# Patient Record
Sex: FEMALE | Race: WHITE | NOT HISPANIC OR LATINO
[De-identification: names, ages, dates, MRNs, and addresses within clinical notes are randomized per-mention and may not be internally consistent; named-entity substitution may affect disease eponyms.]

---

## 2017-12-27 PROBLEM — Z00.00 ENCOUNTER FOR PREVENTIVE HEALTH EXAMINATION: Status: ACTIVE | Noted: 2017-12-27

## 2018-01-11 ENCOUNTER — APPOINTMENT (OUTPATIENT)
Dept: ULTRASOUND IMAGING | Facility: IMAGING CENTER | Age: 78
End: 2018-01-11

## 2018-02-05 ENCOUNTER — APPOINTMENT (OUTPATIENT)
Dept: SURGICAL ONCOLOGY | Facility: CLINIC | Age: 78
End: 2018-02-05

## 2018-02-05 VITALS
SYSTOLIC BLOOD PRESSURE: 192 MMHG | OXYGEN SATURATION: 97 % | HEIGHT: 60 IN | BODY MASS INDEX: 29.45 KG/M2 | HEART RATE: 67 BPM | DIASTOLIC BLOOD PRESSURE: 91 MMHG | WEIGHT: 150 LBS

## 2018-02-12 ENCOUNTER — APPOINTMENT (OUTPATIENT)
Dept: SURGICAL ONCOLOGY | Facility: CLINIC | Age: 78
End: 2018-02-12
Payer: MEDICARE

## 2018-02-12 VITALS
BODY MASS INDEX: 29.45 KG/M2 | OXYGEN SATURATION: 98 % | HEART RATE: 60 BPM | DIASTOLIC BLOOD PRESSURE: 80 MMHG | WEIGHT: 150 LBS | SYSTOLIC BLOOD PRESSURE: 172 MMHG | HEIGHT: 60 IN

## 2018-02-12 DIAGNOSIS — Z82.49 FAMILY HISTORY OF ISCHEMIC HEART DISEASE AND OTHER DISEASES OF THE CIRCULATORY SYSTEM: ICD-10-CM

## 2018-02-12 DIAGNOSIS — Z78.9 OTHER SPECIFIED HEALTH STATUS: ICD-10-CM

## 2018-02-12 PROCEDURE — 99205 OFFICE O/P NEW HI 60 MIN: CPT

## 2018-02-22 ENCOUNTER — LABORATORY RESULT (OUTPATIENT)
Age: 78
End: 2018-02-22

## 2018-02-22 ENCOUNTER — APPOINTMENT (OUTPATIENT)
Dept: SURGICAL ONCOLOGY | Facility: CLINIC | Age: 78
End: 2018-02-22
Payer: MEDICARE

## 2018-02-22 DIAGNOSIS — E04.9 NONTOXIC GOITER, UNSPECIFIED: ICD-10-CM

## 2018-02-22 PROCEDURE — 10022: CPT

## 2018-09-10 ENCOUNTER — APPOINTMENT (OUTPATIENT)
Dept: SURGICAL ONCOLOGY | Facility: CLINIC | Age: 78
End: 2018-09-10
Payer: MEDICARE

## 2018-09-10 VITALS
BODY MASS INDEX: 29.45 KG/M2 | DIASTOLIC BLOOD PRESSURE: 74 MMHG | WEIGHT: 150 LBS | RESPIRATION RATE: 15 BRPM | SYSTOLIC BLOOD PRESSURE: 155 MMHG | HEART RATE: 60 BPM | HEIGHT: 60 IN

## 2018-09-10 DIAGNOSIS — E04.2 NONTOXIC MULTINODULAR GOITER: ICD-10-CM

## 2018-09-10 DIAGNOSIS — E04.1 NONTOXIC SINGLE THYROID NODULE: ICD-10-CM

## 2018-09-10 PROCEDURE — 99214 OFFICE O/P EST MOD 30 MIN: CPT

## 2018-09-18 ENCOUNTER — FORM ENCOUNTER (OUTPATIENT)
Age: 78
End: 2018-09-18

## 2018-09-19 ENCOUNTER — OUTPATIENT (OUTPATIENT)
Dept: OUTPATIENT SERVICES | Facility: HOSPITAL | Age: 78
LOS: 1 days | End: 2018-09-19
Payer: MEDICARE

## 2018-09-19 ENCOUNTER — APPOINTMENT (OUTPATIENT)
Dept: ULTRASOUND IMAGING | Facility: IMAGING CENTER | Age: 78
End: 2018-09-19
Payer: MEDICARE

## 2018-09-19 DIAGNOSIS — E04.9 NONTOXIC GOITER, UNSPECIFIED: ICD-10-CM

## 2018-09-19 DIAGNOSIS — E04.2 NONTOXIC MULTINODULAR GOITER: ICD-10-CM

## 2018-09-19 PROCEDURE — 76536 US EXAM OF HEAD AND NECK: CPT | Mod: 26

## 2018-09-19 PROCEDURE — 76536 US EXAM OF HEAD AND NECK: CPT

## 2020-10-21 ENCOUNTER — RESULT REVIEW (OUTPATIENT)
Age: 80
End: 2020-10-21

## 2021-04-02 ENCOUNTER — APPOINTMENT (OUTPATIENT)
Dept: OBGYN | Facility: CLINIC | Age: 81
End: 2021-04-02

## 2024-02-12 ENCOUNTER — INPATIENT (INPATIENT)
Facility: HOSPITAL | Age: 84
LOS: 3 days | Discharge: HOME HEALTH | End: 2024-02-16
Attending: STUDENT IN AN ORGANIZED HEALTH CARE EDUCATION/TRAINING PROGRAM | Admitting: STUDENT IN AN ORGANIZED HEALTH CARE EDUCATION/TRAINING PROGRAM
Payer: MEDICARE

## 2024-02-12 VITALS
HEART RATE: 102 BPM | DIASTOLIC BLOOD PRESSURE: 100 MMHG | RESPIRATION RATE: 22 BRPM | SYSTOLIC BLOOD PRESSURE: 135 MMHG | OXYGEN SATURATION: 100 % | TEMPERATURE: 97 F

## 2024-02-12 DIAGNOSIS — I50.9 HEART FAILURE, UNSPECIFIED: ICD-10-CM

## 2024-02-12 DIAGNOSIS — I10 ESSENTIAL (PRIMARY) HYPERTENSION: ICD-10-CM

## 2024-02-12 DIAGNOSIS — E78.5 HYPERLIPIDEMIA, UNSPECIFIED: ICD-10-CM

## 2024-02-12 DIAGNOSIS — I48.91 UNSPECIFIED ATRIAL FIBRILLATION: ICD-10-CM

## 2024-02-12 DIAGNOSIS — Z29.9 ENCOUNTER FOR PROPHYLACTIC MEASURES, UNSPECIFIED: ICD-10-CM

## 2024-02-12 DIAGNOSIS — E04.9 NONTOXIC GOITER, UNSPECIFIED: ICD-10-CM

## 2024-02-12 LAB
ALBUMIN SERPL ELPH-MCNC: 4.4 G/DL — SIGNIFICANT CHANGE UP (ref 3.3–5)
ALP SERPL-CCNC: 70 U/L — SIGNIFICANT CHANGE UP (ref 40–120)
ALT FLD-CCNC: 24 U/L — SIGNIFICANT CHANGE UP (ref 4–33)
ANION GAP SERPL CALC-SCNC: 15 MMOL/L — HIGH (ref 7–14)
ANION GAP SERPL CALC-SCNC: 20 MMOL/L — HIGH (ref 7–14)
APTT BLD: 33.4 SEC — SIGNIFICANT CHANGE UP (ref 24.5–35.6)
AST SERPL-CCNC: 47 U/L — HIGH (ref 4–32)
BASE EXCESS BLDV CALC-SCNC: 0.2 MMOL/L — SIGNIFICANT CHANGE UP (ref -2–3)
BASOPHILS # BLD AUTO: 0.05 K/UL — SIGNIFICANT CHANGE UP (ref 0–0.2)
BASOPHILS NFR BLD AUTO: 0.5 % — SIGNIFICANT CHANGE UP (ref 0–2)
BILIRUB SERPL-MCNC: 1.1 MG/DL — SIGNIFICANT CHANGE UP (ref 0.2–1.2)
BLOOD GAS VENOUS COMPREHENSIVE RESULT: SIGNIFICANT CHANGE UP
BLOOD GAS VENOUS COMPREHENSIVE RESULT: SIGNIFICANT CHANGE UP
BUN SERPL-MCNC: 24 MG/DL — HIGH (ref 7–23)
BUN SERPL-MCNC: 26 MG/DL — HIGH (ref 7–23)
CALCIUM SERPL-MCNC: 9.4 MG/DL — SIGNIFICANT CHANGE UP (ref 8.4–10.5)
CALCIUM SERPL-MCNC: 9.8 MG/DL — SIGNIFICANT CHANGE UP (ref 8.4–10.5)
CHLORIDE BLDV-SCNC: 98 MMOL/L — SIGNIFICANT CHANGE UP (ref 96–108)
CHLORIDE SERPL-SCNC: 95 MMOL/L — LOW (ref 98–107)
CHLORIDE SERPL-SCNC: 97 MMOL/L — LOW (ref 98–107)
CO2 BLDV-SCNC: 28 MMOL/L — HIGH (ref 22–26)
CO2 SERPL-SCNC: 20 MMOL/L — LOW (ref 22–31)
CO2 SERPL-SCNC: 28 MMOL/L — SIGNIFICANT CHANGE UP (ref 22–31)
CREAT SERPL-MCNC: 0.88 MG/DL — SIGNIFICANT CHANGE UP (ref 0.5–1.3)
CREAT SERPL-MCNC: 0.92 MG/DL — SIGNIFICANT CHANGE UP (ref 0.5–1.3)
EGFR: 62 ML/MIN/1.73M2 — SIGNIFICANT CHANGE UP
EGFR: 65 ML/MIN/1.73M2 — SIGNIFICANT CHANGE UP
EOSINOPHIL # BLD AUTO: 0.01 K/UL — SIGNIFICANT CHANGE UP (ref 0–0.5)
EOSINOPHIL NFR BLD AUTO: 0.1 % — SIGNIFICANT CHANGE UP (ref 0–6)
GAS PNL BLDV: 128 MMOL/L — LOW (ref 136–145)
GLUCOSE BLDV-MCNC: 116 MG/DL — HIGH (ref 70–99)
GLUCOSE SERPL-MCNC: 101 MG/DL — HIGH (ref 70–99)
GLUCOSE SERPL-MCNC: 114 MG/DL — HIGH (ref 70–99)
HCO3 BLDV-SCNC: 26 MMOL/L — SIGNIFICANT CHANGE UP (ref 22–29)
HCT VFR BLD CALC: 44.8 % — SIGNIFICANT CHANGE UP (ref 34.5–45)
HCT VFR BLDA CALC: 44 % — SIGNIFICANT CHANGE UP (ref 34.5–46.5)
HGB BLD CALC-MCNC: 14.5 G/DL — SIGNIFICANT CHANGE UP (ref 11.7–16.1)
HGB BLD-MCNC: 14 G/DL — SIGNIFICANT CHANGE UP (ref 11.5–15.5)
IANC: 8.26 K/UL — HIGH (ref 1.8–7.4)
IMM GRANULOCYTES NFR BLD AUTO: 0.5 % — SIGNIFICANT CHANGE UP (ref 0–0.9)
INR BLD: 1.27 RATIO — HIGH (ref 0.85–1.18)
LACTATE BLDV-MCNC: 3.8 MMOL/L — HIGH (ref 0.5–2)
LACTATE SERPL-SCNC: 2.9 MMOL/L — HIGH (ref 0.5–2)
LYMPHOCYTES # BLD AUTO: 1.94 K/UL — SIGNIFICANT CHANGE UP (ref 1–3.3)
LYMPHOCYTES # BLD AUTO: 17.8 % — SIGNIFICANT CHANGE UP (ref 13–44)
MAGNESIUM SERPL-MCNC: 1.8 MG/DL — SIGNIFICANT CHANGE UP (ref 1.6–2.6)
MAGNESIUM SERPL-MCNC: 2.2 MG/DL — SIGNIFICANT CHANGE UP (ref 1.6–2.6)
MCHC RBC-ENTMCNC: 25.1 PG — LOW (ref 27–34)
MCHC RBC-ENTMCNC: 31.3 GM/DL — LOW (ref 32–36)
MCV RBC AUTO: 80.3 FL — SIGNIFICANT CHANGE UP (ref 80–100)
MONOCYTES # BLD AUTO: 0.59 K/UL — SIGNIFICANT CHANGE UP (ref 0–0.9)
MONOCYTES NFR BLD AUTO: 5.4 % — SIGNIFICANT CHANGE UP (ref 2–14)
NEUTROPHILS # BLD AUTO: 8.26 K/UL — HIGH (ref 1.8–7.4)
NEUTROPHILS NFR BLD AUTO: 75.7 % — SIGNIFICANT CHANGE UP (ref 43–77)
NRBC # BLD: 0 /100 WBCS — SIGNIFICANT CHANGE UP (ref 0–0)
NRBC # FLD: 0 K/UL — SIGNIFICANT CHANGE UP (ref 0–0)
NT-PROBNP SERPL-SCNC: HIGH PG/ML
PCO2 BLDV: 48 MMHG — SIGNIFICANT CHANGE UP (ref 39–52)
PH BLDV: 7.35 — SIGNIFICANT CHANGE UP (ref 7.32–7.43)
PHOSPHATE SERPL-MCNC: 3.8 MG/DL — SIGNIFICANT CHANGE UP (ref 2.5–4.5)
PHOSPHATE SERPL-MCNC: 4 MG/DL — SIGNIFICANT CHANGE UP (ref 2.5–4.5)
PLATELET # BLD AUTO: 201 K/UL — SIGNIFICANT CHANGE UP (ref 150–400)
PO2 BLDV: 21 MMHG — LOW (ref 25–45)
POTASSIUM BLDV-SCNC: SIGNIFICANT CHANGE UP MMOL/L (ref 3.5–5.1)
POTASSIUM SERPL-MCNC: 3.4 MMOL/L — LOW (ref 3.5–5.3)
POTASSIUM SERPL-MCNC: 5.3 MMOL/L — SIGNIFICANT CHANGE UP (ref 3.5–5.3)
POTASSIUM SERPL-SCNC: 3.4 MMOL/L — LOW (ref 3.5–5.3)
POTASSIUM SERPL-SCNC: 5.3 MMOL/L — SIGNIFICANT CHANGE UP (ref 3.5–5.3)
PROT SERPL-MCNC: 8 G/DL — SIGNIFICANT CHANGE UP (ref 6–8.3)
PROTHROM AB SERPL-ACNC: 14.1 SEC — HIGH (ref 9.5–13)
RBC # BLD: 5.58 M/UL — HIGH (ref 3.8–5.2)
RBC # FLD: 17.3 % — HIGH (ref 10.3–14.5)
SAO2 % BLDV: 42 % — LOW (ref 67–88)
SODIUM SERPL-SCNC: 135 MMOL/L — SIGNIFICANT CHANGE UP (ref 135–145)
SODIUM SERPL-SCNC: 140 MMOL/L — SIGNIFICANT CHANGE UP (ref 135–145)
T3 SERPL-MCNC: 112 NG/DL — SIGNIFICANT CHANGE UP (ref 80–200)
T4 AB SER-ACNC: 13.72 UG/DL — HIGH (ref 5.1–13)
TROPONIN T, HIGH SENSITIVITY RESULT: 29 NG/L — SIGNIFICANT CHANGE UP
TSH SERPL-MCNC: 0.64 UIU/ML — SIGNIFICANT CHANGE UP (ref 0.27–4.2)
WBC # BLD: 10.9 K/UL — HIGH (ref 3.8–10.5)
WBC # FLD AUTO: 10.9 K/UL — HIGH (ref 3.8–10.5)

## 2024-02-12 PROCEDURE — 93308 TTE F-UP OR LMTD: CPT | Mod: 26

## 2024-02-12 PROCEDURE — 71046 X-RAY EXAM CHEST 2 VIEWS: CPT | Mod: 26

## 2024-02-12 PROCEDURE — 99222 1ST HOSP IP/OBS MODERATE 55: CPT | Mod: GC

## 2024-02-12 PROCEDURE — 99222 1ST HOSP IP/OBS MODERATE 55: CPT | Mod: AI

## 2024-02-12 PROCEDURE — 71275 CT ANGIOGRAPHY CHEST: CPT | Mod: 26

## 2024-02-12 PROCEDURE — 99291 CRITICAL CARE FIRST HOUR: CPT | Mod: GC

## 2024-02-12 RX ORDER — SODIUM CHLORIDE 9 MG/ML
1000 INJECTION INTRAMUSCULAR; INTRAVENOUS; SUBCUTANEOUS ONCE
Refills: 0 | Status: DISCONTINUED | OUTPATIENT
Start: 2024-02-12 | End: 2024-02-12

## 2024-02-12 RX ORDER — ATORVASTATIN CALCIUM 80 MG/1
40 TABLET, FILM COATED ORAL AT BEDTIME
Refills: 0 | Status: DISCONTINUED | OUTPATIENT
Start: 2024-02-12 | End: 2024-02-16

## 2024-02-12 RX ORDER — FUROSEMIDE 40 MG
40 TABLET ORAL EVERY 12 HOURS
Refills: 0 | Status: DISCONTINUED | OUTPATIENT
Start: 2024-02-12 | End: 2024-02-15

## 2024-02-12 RX ORDER — DIGOXIN 250 MCG
500 TABLET ORAL ONCE
Refills: 0 | Status: COMPLETED | OUTPATIENT
Start: 2024-02-12 | End: 2024-02-12

## 2024-02-12 RX ORDER — SENNA PLUS 8.6 MG/1
2 TABLET ORAL AT BEDTIME
Refills: 0 | Status: DISCONTINUED | OUTPATIENT
Start: 2024-02-12 | End: 2024-02-16

## 2024-02-12 RX ORDER — RIVAROXABAN 15 MG-20MG
15 KIT ORAL
Refills: 0 | Status: DISCONTINUED | OUTPATIENT
Start: 2024-02-12 | End: 2024-02-13

## 2024-02-12 RX ORDER — DILTIAZEM HCL 120 MG
10 CAPSULE, EXT RELEASE 24 HR ORAL ONCE
Refills: 0 | Status: COMPLETED | OUTPATIENT
Start: 2024-02-12 | End: 2024-02-12

## 2024-02-12 RX ORDER — RIVAROXABAN 15 MG-20MG
1 KIT ORAL
Refills: 0 | DISCHARGE

## 2024-02-12 RX ORDER — ATORVASTATIN CALCIUM 80 MG/1
1 TABLET, FILM COATED ORAL
Refills: 0 | DISCHARGE

## 2024-02-12 RX ORDER — DIGOXIN 250 MCG
500 TABLET ORAL ONCE
Refills: 0 | Status: DISCONTINUED | OUTPATIENT
Start: 2024-02-12 | End: 2024-02-12

## 2024-02-12 RX ORDER — LOSARTAN/HYDROCHLOROTHIAZIDE 100MG-25MG
1 TABLET ORAL
Refills: 0 | DISCHARGE

## 2024-02-12 RX ORDER — DIGOXIN 250 MCG
250 TABLET ORAL ONCE
Refills: 0 | Status: DISCONTINUED | OUTPATIENT
Start: 2024-02-13 | End: 2024-02-13

## 2024-02-12 RX ORDER — RIVAROXABAN 15 MG-20MG
20 KIT ORAL
Refills: 0 | Status: DISCONTINUED | OUTPATIENT
Start: 2024-02-12 | End: 2024-02-12

## 2024-02-12 RX ORDER — DILTIAZEM HCL 120 MG
30 CAPSULE, EXT RELEASE 24 HR ORAL ONCE
Refills: 0 | Status: COMPLETED | OUTPATIENT
Start: 2024-02-12 | End: 2024-02-12

## 2024-02-12 RX ORDER — LOSARTAN POTASSIUM 100 MG/1
50 TABLET, FILM COATED ORAL DAILY
Refills: 0 | Status: DISCONTINUED | OUTPATIENT
Start: 2024-02-12 | End: 2024-02-14

## 2024-02-12 RX ORDER — FUROSEMIDE 40 MG
40 TABLET ORAL ONCE
Refills: 0 | Status: COMPLETED | OUTPATIENT
Start: 2024-02-12 | End: 2024-02-12

## 2024-02-12 RX ORDER — POTASSIUM CHLORIDE 20 MEQ
40 PACKET (EA) ORAL ONCE
Refills: 0 | Status: COMPLETED | OUTPATIENT
Start: 2024-02-12 | End: 2024-02-12

## 2024-02-12 RX ADMIN — Medication 10 MILLIGRAM(S): at 11:53

## 2024-02-12 RX ADMIN — SENNA PLUS 2 TABLET(S): 8.6 TABLET ORAL at 21:45

## 2024-02-12 RX ADMIN — Medication 500 MICROGRAM(S): at 20:36

## 2024-02-12 RX ADMIN — Medication 40 MILLIGRAM(S): at 13:29

## 2024-02-12 RX ADMIN — ATORVASTATIN CALCIUM 40 MILLIGRAM(S): 80 TABLET, FILM COATED ORAL at 21:45

## 2024-02-12 RX ADMIN — Medication 40 MILLIEQUIVALENT(S): at 21:54

## 2024-02-12 RX ADMIN — Medication 30 MILLIGRAM(S): at 12:05

## 2024-02-12 RX ADMIN — Medication 40 MILLIGRAM(S): at 17:24

## 2024-02-12 NOTE — H&P ADULT - NSHPPHYSICALEXAM_GEN_ALL_CORE
HEENT: NC/AT. Sclera clear, no conjunctival pallor. EOMI, PERRLA. No nasal discharge. Throat: no erythema, no exudates on tonsils, uvula midline.  Cardiac: irregular rhythm, tachycardic, +S1/S2. No murmurs, rubs, or gallops.   Chest: Crackles at lung bases b/l. No wheezing.  Abdomen: Soft, nontender, mildly distended. No guarding, no rebound tenderness.  Neuro: Alert and oriented x3. Motor strength and sensation intact.    Extremities: No rashes, no bruising. No joint swelling. 1+ pitting edema in b/l LE. Good peripheral pulses bilaterally.

## 2024-02-12 NOTE — H&P ADULT - PROBLEM SELECTOR PLAN 1
No known Hx of HF.  ProBNP 15.9k. Trop neg.    - f/u TTE No known Hx of HF.  ProBNP 15.9k. Trop neg.    - f/u TTE  - lasix   - f/u cards  - BMP bid No known Hx of HF.  ProBNP 15.9k. Trop neg.    - f/u TTE  - lasix 40mg IV bid  - f/u cards  - BMP bid  - strict I&Os, daily weights  - wean O2 as tolerated  - trend VBG, lactate No known Hx of HF.  ProBNP 15.9k. Trop neg.    - strict I&Os, daily weights  - wean O2 as tolerated  - trend VBG, lactate  - f/u TTE  - c/w lasix 40mg IV bid, BMP bid  - f/u cards No known Hx of HF.  Admission: ProBNP 15.9k. Trop neg. VBG showing pH 7.35, pCO2 48, lactate 3.8 -> 2.9.  CXR showed small b/l pleural effusions/adjacent interstitial infiltrate/atelectasis. ED POCUS TTE showed severely decreased cardiac contractility, plethoric IVC, large b/l pleural effusions favoring CHF vs less likely PNA.  CTA chest showed no PE, mod size b/l pleural effusions (R > L) w R middle lobe.    - appreciate cards eval  - strict I&Os, daily weights, 1.5L/day fluid restriction  - wean O2 as tolerated  - trend BMP bid, VBG, lactate  - c/w lasix 40mg IV bid, goal net neg 1-2L/day  - f/u TTE

## 2024-02-12 NOTE — H&P ADULT - PROBLEM SELECTOR PLAN 3
home regimen: Losartan-HCTZ 50-12.5mg qd, amlodipine ?mg qd    - home regimen: Losartan-HCTZ 50-12.5mg qd, amlodipine ?mg qd    - holding anti-hypertensives for now, see a-fib problem home regimen: Losartan-HCTZ 50-12.5mg qd, amlodipine ?mg qd    - appreciate cards consult  - hold amlodipine, can add PO hydral if needed  - c/w home losartan for afterload reduction, holding HCTZ

## 2024-02-12 NOTE — CONSULT NOTE ADULT - ASSESSMENT
84 yo F w PMHx HTN, HLD, A-fib on Xarelto, adenomatous nodular goiter presenting to the ED with fatigue, SOB, with clinical evidence of volume overload concerning for acute decompensated heart failure. Chest CTA was negative for a PE but with bilateral pleural effusions.     #Acute decompensated heart failure. Patient with worsening subjective dyspnea, volume overloaded on exam with JVD to 12cm, bilateral lung crackles, 1-2+ LE edema and BNP 15,911    - Agree with IV lasix 40mg BID  - Continue home losartan for afterload reduction  - TTE  - Hold off on amlodipine, for now, can add PO hydralazine as needed for optimal BP control  - Repeat lactate in 4 hours  - Strict I/Os, daily standing weights  - Fluid restriction 1.5L /day    #Atrial Fibrillation ([id number] = 4 )   - Would load digoxin as follows: IV digoxin 500mcg now followed 250mcg Q6 hours for a total of 1mg load  - Avoid CCB for now  - Please obtain weight to calculate creatine clearance   ---- Xarelto 20mg daily if CrCl > 50  -------Xarelto 15mg daily if CrCl 15-50  - Keep K > 4 and Mg >2  - Telemetry monitoring    Recommendations are preliminary until attending attestation.    Roselyn Murry MD  Cardiology Fellow

## 2024-02-12 NOTE — H&P ADULT - ATTENDING COMMENTS
83-year-old woman with atrial fibrillation, hypertension and dyslipidemia who presented with about ten days of worsening fatigue, dyspnea, and anorexia found to have significant hypoxia, likely pulmonary congestion and atrial fibrillation with rapid ventricular response concerning for acute decompensated heart failure.    #Hypoxic resp failure iso likely ADHF  - Lasix 40 IV bid, TTE, trend I/O, daily weight , BMP bid    #Afib RVR likely iso ADHF, or Afib RVR causing low CO  - Cards recs apprreciated  - Continue AC on Xarelto  - Start dig per cards  - will not do beta blocker at this point due to ADHF, can worsen CO   - TFTs to rule out possible thyroid storm causing sx    #Leukocytosis   - likely reactive, monitor off abx    #Lactate  - trend, likely due to ADHF    Would address GOC tmrw     Rest as above

## 2024-02-12 NOTE — ED PROVIDER NOTE - ATTENDING CONTRIBUTION TO CARE
Dr. Saavedra:  I have personally performed a face to face bedside history and physical examination of this patient. I have discussed the history, examination, review of systems, assessment and plan of management with the resident. I have reviewed the electronic medical record and amended it to reflect my history, review of systems, physical exam, assessment and plan.    83F h/o HTN, HLD, on Xarelto (unsure why), presents with 2 weeks of generalized weakness, fatigue, decreased appetite, and worsening BLE edema.  EKG shows rapid afib HR 140s    Exam:  - nad  - tachy, irregular  - rales bilaterally  - abd soft ntnd  - 2+ pitting edema BLE    A/P  - afib with RVR, suspect fluid overload  - cbc, cmp, trop, bnp, tsh, ekg, cxr

## 2024-02-12 NOTE — H&P ADULT - PROBLEM SELECTOR PLAN 2
Per chart from 2018 used to be on Digoxin but not currently.    - c/w home Xarelto 20mg Per chart from 2018 used to be on Digoxin but not currently.    - c/w home Xarelto 20mg qd  - f/u cards for rate control?, for now Diltiazem 10mg IV PRN Per chart from 2018 used to be on Digoxin but not currently.  On Xarelto 20mg qd at home.    - c/w Xarelto 15mg qd (for Cr clearance ~45 )  - f/u cards for rate control?, for now Diltiazem 10mg IV PRN Per chart from 2018 used to be on Digoxin but not currently.  On Xarelto 20mg qd at home.    - [id number] = 4   - c/w Xarelto 15mg qd (for Cr clearance ~45, if CrCl > 50 back to 20mg)  - avoiding BBs and CCBs i/s/o likely ADHF  - start Digoxin load 500mcg IV then 250mcg q6 for total of 1mg load

## 2024-02-12 NOTE — CONSULT NOTE ADULT - SUBJECTIVE AND OBJECTIVE BOX
CARDIOLOGY FELLOW CONSULT NOTE    HPI:  Pt is a 84 yo F w PMHx HTN, HLD, A-fib on Xarelto, adenomatous nodular goiter presenting to the ED with fatigue, SOB, and poor appetite for the past ~ 10 days, and pt's son noticed she was looking pale and dry heaving earlier this AM which prompted the ED visit. Pt speaks Spanish, son Zack at bedside provided translation. Pt's son states the symptoms have progressively worsened to the point that she gets SOB after taking a few steps. Prior to the recent onset of symptoms, son states pt was in relatively good health, able to walk and move around without any problem. Pt's son states she might have had a TTE in the past but unsure of results, does not recall any Dx of heart failure. Pt states she has not visited her cardiologist in a few years. Pt endorses leg swelling, but denies any orthopnea, PND, palpitations. Pt denies fever, chills, cough, abd pain, current n/v, d/c, urinary changes.     ED course: vitals 135/100, , RR 22, satting 100% on RA, afebrile. Labs remarkable for proBNP of 15.9k, troponin wnl. EKG showed a-fib w VR 87.. Pt was satting 90% on 6L NC and was put on BIPAP. Pt was given Cardizem 10mg IV, Cardizem 30mg PO, and lasix 40mg IV. Pt was weaned off BIPAP while still in the ED to NC. (12 Feb 2024 16:38)      PMHx:   HTN (hypertension)    HLD (hyperlipidemia)        PSHx:       Allergies:  No Known Allergies      Home Meds:    Current Medications:   furosemide   Injectable 40 milliGRAM(s) IV Push every 12 hours  senna 2 Tablet(s) Oral at bedtime      FAMILY HISTORY:      ROS:  CV: chest pain (-), palpitation (-), orthopnea (-), PND (-), edema (-)  PULM: SOB (-), cough (-), wheezing (-), hemoptysis (-).   CONST: fever (-), chills (-) or fatigue (-)  GI: abdominal distension (-), abdominal pain (-) , nausea/vomiting (-), hematemesis, (-), melena (-), hematochezia (-)  : dysuria (-), frequency (-), hematuria (-).   NEURO: numbness (-), weakness (-), dizziness (-)  SKIN: itching (-), rash (-)  HEENT:  visual changes (-); vertigo or throat pain (-);  neck stiffness (-)     Physical Exam:  T(F): 97.6 (02-12), Max: 98.6 (02-12)  HR: 125 (02-12) (99 - 133)  BP: 137/99 (02-12) (103/92 - 137/99)  RR: 25 (02-12)  SpO2: 100% (02-12)    GENERAL: NAD  HEAD:  Atraumatic, Normocephalic.  EYES: EOMI, PERRLA, conjunctiva and sclera clear.  NECK: Supple, No JVD.  CHEST/LUNG: Clear to auscultation bilaterally; No rales, rhonchi, wheezing, or rubs. Speaking in full sentences  HEART: Regular rate and rhythm; No murmurs, rubs, or gallops.  ABDOMEN: Bowel sounds present; Soft, Nontender, Nondistended.   EXTREMITIES:  2+ Peripheral Pulses, brisk capillary refill. No clubbing, cyanosis, or edema.  PSYCH: Normal affect.  SKIN: No rashes or lesions.    ECG: Personally reviewed    Echo: Personally reviewed    Stress Testing: Personally reviewed    Cath: Personally reviewed    CXR: Personally reviewed    Labs: Personally reviewed                        14.0   10.90 )-----------( 201      ( 12 Feb 2024 11:48 )             44.8     02-12    135  |  95<L>  |  26<H>  ----------------------------<  114<H>  5.3   |  20<L>  |  0.88    Ca    9.8      12 Feb 2024 11:48  Phos  3.8     02-12  Mg     2.20     02-12    TPro  8.0  /  Alb  4.4  /  TBili  1.1  /  DBili  x   /  AST  47<H>  /  ALT  24  /  AlkPhos  70  02-12    PT/INR - ( 12 Feb 2024 11:48 )   PT: 14.1 sec;   INR: 1.27 ratio         PTT - ( 12 Feb 2024 11:48 )  PTT:33.4 sec    CARDIAC MARKERS ( 12 Feb 2024 11:48 )  29 ng/L / x     / x     / x     / x     / x                  Thyroid Stimulating Hormone, Serum: 0.64 uIU/mL (02-12 @ 11:48)     CARDIOLOGY FELLOW CONSULT NOTE    HPI:   82 yo F w PMHx HTN, HLD, A-fib on Xarelto, adenomatous nodular goiter presenting to the ED with fatigue, SOB, and poor appetite for the past ~ 10 days, and pt's son noticed she was looking pale and dry heaving earlier this AM which prompted the ED visit.  Pt's son states the symptoms have progressively worsened to the point that she gets SOB after taking a few steps. Prior to the recent onset of symptoms, son states pt was in relatively good health, able to walk and move around without any problem. Pt states she has not visited her cardiologist in a few years. Pt endorses leg swelling, but denies any orthopnea, PND, palpitations. Pt denies fever, chills, cough, abd pain, current n/v, d/c, urinary changes.   In the ED she was noted to be hypoxemic requring supplemental oxygen with A.fib with RVR.     PMHx:   HTN (hypertension)  HLD (hyperlipidemia)        Allergies:  No Known Allergies    Home Meds:    Current Medications:   furosemide   Injectable 40 milliGRAM(s) IV Push every 12 hours  senna 2 Tablet(s) Oral at bedtime    FAMILY HISTORY:    ROS:  CV: chest pain (-), palpitation (+), orthopnea (-), PND (-), edema (-)  PULM: SOB (+), cough (-), wheezing (-), hemoptysis (-).   CONST: fever (-), chills (-) or fatigue (-)  GI: abdominal distension (-), abdominal pain (-) , nausea/vomiting (-), hematemesis, (-), melena (-), hematochezia (-)  : dysuria (-), frequency (-), hematuria (-).   NEURO: numbness (-), weakness (+), dizziness (-)  SKIN: itching (-), rash (-)  HEENT:  visual changes (-); vertigo or throat pain (-);  neck stiffness (-)     Physical Exam:  T(F): 97.6 (02-12), Max: 98.6 (02-12)  HR: 125 (02-12) (99 - 133)  BP: 137/99 (02-12) (103/92 - 137/99)  RR: 25 (02-12)  SpO2: 100% (02-12)    GENERAL: NAD  HEAD:  Atraumatic, Normocephalic.  EYES: EOMI, PERRLA, conjunctiva and sclera clear.  NECK: Supple, No JVD.  CHEST/LUNG: Reduced air entry in bilateral bases with appreciable crackles   HEART: Regular rate and rhythm; No murmurs, rubs, or gallops.  ABDOMEN: Bowel sounds present; Soft, Nontender, Nondistended.   EXTREMITIES:  2+ Peripheral Pulses, brisk capillary refill. No clubbing, cyanosis, or edema.  PSYCH: Normal affect.  SKIN: No rashes or lesions.                          14.0   10.90 )-----------( 201      ( 12 Feb 2024 11:48 )             44.8     02-12    135  |  95<L>  |  26<H>  ----------------------------<  114<H>  5.3   |  20<L>  |  0.88    Ca    9.8      12 Feb 2024 11:48  Phos  3.8     02-12  Mg     2.20     02-12    TPro  8.0  /  Alb  4.4  /  TBili  1.1  /  DBili  x   /  AST  47<H>  /  ALT  24  /  AlkPhos  70  02-12    PT/INR - ( 12 Feb 2024 11:48 )   PT: 14.1 sec;   INR: 1.27 ratio         PTT - ( 12 Feb 2024 11:48 )  PTT:33.4 sec    CARDIAC MARKERS ( 12 Feb 2024 11:48 )  29 ng/L / x     / x     / x     / x     / x        Thyroid Stimulating Hormone, Serum: 0.64 uIU/mL (02-12 @ 11:48)

## 2024-02-12 NOTE — ED PROVIDER NOTE - NS ED ROS FT
CONSTITUTIONAL: c/o gen. weakness   EYES/ENT: No visual changes;  No throat pain   NECK: No pain   RESPIRATORY: No cough, shortness of breath  CARDIOVASCULAR: No chest pain, palpitations  GASTROINTESTINAL: c/o reduced appetite   GENITOURINARY: No dysuria, frequency or hematuria  NEUROLOGICAL: No numbness, weakness  SKIN: No rashes, or lesions     All other review of systems is negative unless indicated above.

## 2024-02-12 NOTE — H&P ADULT - NSHPLABSRESULTS_GEN_ALL_CORE
14.0   10.90 )-----------( 201      ( 12 Feb 2024 11:48 )             44.8     02-12    135  |  95<L>  |  26<H>  ----------------------------<  114<H>  5.3   |  20<L>  |  0.88    Ca    9.8      12 Feb 2024 11:48  Phos  3.8     02-12  Mg     2.20     02-12    TPro  8.0  /  Alb  4.4  /  TBili  1.1  /  DBili  x   /  AST  47<H>  /  ALT  24  /  AlkPhos  70  02-12

## 2024-02-12 NOTE — ED PROVIDER NOTE - PROGRESS NOTE DETAILS
patient satting 90% on 6L, placed on BIPAP, cardiology consulted.  Initially admitted to tele doc of the day but given clinical severity, will plan for hospitalist admission instead as per tele doc request. Kana FITZPATRICK: CCU evaluated the patient at bedside, recommending patient does not need ICU services at the moment, will proceed to hospitalist admission.

## 2024-02-12 NOTE — H&P ADULT - PROBLEM SELECTOR PLAN 4
- f/u lipid profile  - c/w home Lipitor 40mg qhs - f/u lipid profile, a1c  - c/w home Lipitor 40mg qhs

## 2024-02-12 NOTE — H&P ADULT - NSHPREVIEWOFSYSTEMS_GEN_ALL_CORE
General: denies fever, chills, sweats. +poor appetite, fatigute  HEENT: denies headache, dizziness, changes in vision/blurriness  Cardio: denies chest pain, palpitations, orthopnea, PND, +leg swelling  Resp: + SOB, denies cough, rhinorrhea, hemoptysis, wheezing  GI: denies abdominal pain, nausea, vomiting, diarrhea, constipation, dysphagia, hematemesis, hematochezia, melena  : denies dysuria, frequency, hematuria, flank pain  Neuro: denies headache, dizziness, change in level of consciousness, numbness, paraesthesia, seizures General: denies fever, chills, sweats. +poor appetite, fatigue  HEENT: denies headache, dizziness, changes in vision/blurriness  Cardio: denies chest pain, palpitations, orthopnea, PND, +leg swelling  Resp: + SOB, denies cough, rhinorrhea, hemoptysis, wheezing  GI: denies abdominal pain, nausea, vomiting, diarrhea, constipation, dysphagia, hematemesis, hematochezia, melena  : denies dysuria, frequency, hematuria, flank pain  Neuro: denies headache, dizziness, change in level of consciousness, numbness, paraesthesia, seizures

## 2024-02-12 NOTE — ED ADULT NURSE REASSESSMENT NOTE - BP NONINVASIVE SYSTOLIC (MM HG)
Last fill per  12/20/20  Last ov 11/24/2020  Last UDS 11/18/2019      Reviewed report generated by the [facility]. Does not demonstrate aberrancies or inconsistencies with regard to the prescribing of controlled medications to this patient by other providers. 122

## 2024-02-12 NOTE — ED ADULT NURSE NOTE - CHIEF COMPLAINT QUOTE
Pt c/o shortness of breath x 2 weeks. Swelling to both ankles. Son reports increasing weakness, decreased appetite. Had outpatient blood work with no results. Son reports patient returned from [place] 1 month ago

## 2024-02-12 NOTE — H&P ADULT - ASSESSMENT
84yo Latvian speaking F w PMHx HTN, HLD, A-fib on Xarelto, adenomatous nodular goiter presenting to the ED with fatigue, SOB, and poor appetite for the past ~ 10 days. Prior to the recent onset of symptoms, son states pt was in relatively good health, able to walk and move around without any problem. Pt has not visited her cardiologist in several years. Pt found to have large b/l plefs, was briefly on BIPAP in ED weaned back to NC.  Pt admitted for likely acute decompensated (new-onset) heart failure and a-fib w RVR. Per cardio Dr. Chris Le, prefer admission to hospitalist w house cards to follow.

## 2024-02-12 NOTE — ED ADULT NURSE REASSESSMENT NOTE - NS ED NURSE REASSESS COMMENT FT1
A&Ox4, no signs of distress, denies chest pain/SOB at this time, bedside ultrasound in progress, fall precautions maintained
A&Ox4, no signs of respiratory distress, patient to be taken off of BiPAP by team at bedside, pending bed placement, fall precautions maintained
Mobile Critical Care RN: patient is 83/F presenting with SOB and b/l lower extremity edema. on arrival, patient in Afib, tachycardic, IVP cardizem and PO cardizem administered. patient remains in spot 29, states she is breathing better on Bipap 10/5 40%. primafit applied. 40 mg IVP lasix administered. patient is admitted to telemetry.

## 2024-02-12 NOTE — H&P ADULT - HISTORY OF PRESENT ILLNESS
Pt is a 84 yo F w PMHx HTN, HLD, A-fib on Xarelto, adenomatous nodular goiter presenting to the ED with fatigue, SOB, and poor appetite for the past ~ 10 days, and pt's son noticed she was looking pale and dry heaving earlier this AM which prompted the ED visit. Pt speaks Faroese, son Zack at bedside provided translation. Pt's son states the symptoms have progressively worsened to the point that she gets SOB after taking a few steps. Prior to the recent onset of symptoms, son states pt was in relatively good health, able to walk and move around without any problem. Pt's son states she might have had a TTE in the past but unsure of results, does not recall any Dx of heart failure. Pt states she has not visited her cardiologist in a few years. Pt endorses leg swelling, but denies any orthopnea, PND, palpitations. Pt denies fever, chills, cough, abd pain, current n/v, d/c, urinary changes.     ED course: vitals 135/100, , RR 22, satting 100% on RA, afebrile. Labs remarkable for proBNP of 15.9k, troponin wnl. EKG showed atrial. Pt was satting 90% on 6L NC and was put on BIPAP Pt is a 84 yo F w PMHx HTN, HLD, A-fib on Xarelto, adenomatous nodular goiter presenting to the ED with fatigue, SOB, and poor appetite for the past ~ 10 days, and pt's son noticed she was looking pale and dry heaving earlier this AM which prompted the ED visit. Pt speaks Bel[place]ian, son Zack at bedside provided translation. Pt's son states the symptoms have progressively worsened to the point that she gets SOB after taking a few steps. Prior to the recent onset of symptoms, son states pt was in relatively good health, able to walk and move around without any problem. Pt's son states she might have had a TTE in the past but unsure of results, does not recall any Dx of heart failure. Pt states she has not visited her cardiologist in a few years. Pt endorses leg swelling, but denies any orthopnea, PND, palpitations. Pt denies fever, chills, cough, abd pain, current n/v, d/c, urinary changes.     ED course: vitals 135/100, , RR 22, satting 100% on RA, afebrile. Labs remarkable for proBNP of 15.9k, troponin wnl. EKG showed a-fib w VR 87.. Pt was satting 90% on 6L NC and was put on BIPAP. Pt was given Cardizem 10mg IV, Cardizem 30mg PO, and lasix 40mg IV. Pt was weaned off BIPAP while still in the ED to NC. Pt is a 82 yo F w PMHx HTN, HLD, A-fib on Xarelto, adenomatous nodular goiter presenting to the ED with fatigue, SOB, and poor appetite for the past ~ 10 days, and pt's son noticed she was looking pale and dry heaving earlier this AM which prompted the ED visit. Pt speaks Lithuanian, son Zack at bedside provided translation. Pt's son states the symptoms have progressively worsened to the point that she gets SOB after taking a few steps. Prior to the recent onset of symptoms, son states pt was in relatively good health, able to walk and move around without any problem. Pt's son states she might have had a TTE in the past but unsure of results, does not recall any Dx of heart failure. Pt states she has not visited her cardiologist in a few years. Pt endorses leg swelling, but denies any orthopnea, PND, palpitations. Pt denies fever, chills, cough, abd pain, current n/v, d/c, urinary changes.     ED course: vitals 135/100, , RR 22, satting 100% on RA, afebrile. Labs remarkable for proBNP of 15.9k, troponin wnl. VBG showing pH 7.35, pCO2 48, lactate 3.8 -> 2.9. EKG showed a-fib w VR 87. CXR showed small b/l pleural effusions/adjacent interstitial infiltrate/atelectasis. ED POCUS TTE showed severely decreased cardiac contractility, plethoric IVC, large b/l pleural effusions favoring CHF vs less likely PNA.  CTA chest showed no PE, enlarged heterogenous thyroid gland, mod size b/l pleural effusions (R > L) w R middle lobe and b/l lower lobe multisegmental opacification. Pt was satting 90% on 6L NC and was put on BIPAP. Pt was given Cardizem 10mg IV, Cardizem 30mg PO, and lasix 40mg IV.  Pt was weaned off BIPAP while still in the ED to NC.

## 2024-02-12 NOTE — ED PROVIDER NOTE - OBJECTIVE STATEMENT
83-year-old female past medical history of hypertension, hypercholesterolemia, resenting with generalized weakness, reduced appetite, bilateral lower extremity swelling for the past 2 weeks.   Son is at the bedside, reports patient recently visited from [place] in January, since then has noticed that the patient is becoming steadily weak, has been having reduced appetite, and progressive lower extremity swelling.    Denies noticing any fever, lightheadedness, chest pains, palpitations, shortness of breath, nausea/vomiting, abdominal distention/pain, urinary/bowel complaints. Also reports patient being on Xarelto, does not recall exact reasons at the moment.   PCP Dr. Hank Miles.

## 2024-02-12 NOTE — H&P ADULT - CLICK TO LAUNCH ORM
Problem: Patient Care Overview (Adult)  Goal: Plan of Care Review  Outcome: Outcome(s) achieved Date Met:  02/15/17    02/15/17 1744   Patient Care Overview   Progress improving   Coping/Psychosocial Response Interventions   Plan Of Care Reviewed With patient       Goal: Adult Individualization and Mutuality  Outcome: Outcome(s) achieved Date Met:  02/15/17  Goal: Discharge Needs Assessment  Outcome: Outcome(s) achieved Date Met:  02/15/17    Problem: Pain, Acute (Adult)  Goal: Acceptable Pain Control/Comfort Level  Outcome: Outcome(s) achieved Date Met:  02/15/17    Problem: Diarrhea (Adult)  Goal: Improved/Reduced Symptoms  Outcome: Outcome(s) achieved Date Met:  02/15/17       .

## 2024-02-12 NOTE — ED ADULT TRIAGE NOTE - CHIEF COMPLAINT QUOTE
Pt c/o shortness of breath x 2 weeks. Swelling to both ankles. Son reports increasing weakness, decreased appetite. Had outpatient blood work with no results. Pt c/o shortness of breath x 2 weeks. Swelling to both ankles. Son reports increasing weakness, decreased appetite. Had outpatient blood work with no results. Son reports patient returned from [place] 1 month ago

## 2024-02-12 NOTE — ED PROVIDER NOTE - CLINICAL SUMMARY MEDICAL DECISION MAKING FREE TEXT BOX
83-year-old female past medical history of hypertension, hypercholesterolemia, resenting with generalized weakness, reduced appetite, bilateral lower extremity swelling for the past 2 weeks. Patient in A-fib. On exam BL creps on chest auscultation,  BL LE edema. Concern for Atrial fib, P.edema, ACS, electrolyte imbalance. Will order labs, CXR. Shall consider rate control. Unknown A-fib time of onset. Likely admit.

## 2024-02-12 NOTE — ED PROVIDER NOTE - PHYSICAL EXAMINATION
PHYSICAL EXAM:    GENERAL: Lying in bed comfortably  HEAD:  Normocephalic  EYES: EOMI, PERRLA, conjunctiva and sclera clear  ENT: No erythema/pallor/petechiae/lesions  NECK: Supple, No JVD  LUNG: BL rales mid-lower lung zone   HEART: Irr. Irr, +S1/S2; No m/r/g  ABDOMEN: soft, NT/ND; BS audible   EXTREMITIES:  BL LE below knee pitting pedal edema   NERVOUS SYSTEM:  AAOx3, speech clear. No sensory/motor deficits   SKIN: No rashes or lesions

## 2024-02-12 NOTE — H&P ADULT - PROBLEM SELECTOR PLAN 5
Pt s/p FNA in 02/2018 for thyroid nodules, path came back benign, adenomatous nodular goiter.  Currentk    - TSH wnl. f/u T4, T3 Pt s/p FNA in 02/2018 for thyroid nodules, path came back benign, adenomatous nodular goiter.  Currently     - TSH wnl. f/u T4, T3 Pt s/p FNA in 02/2018 for thyroid nodules, path came back benign, adenomatous nodular goiter.  Currently not grossly enlarged on physical exam.  CTA chest 2/12: Enlarged, heterogeneous thyroid gland with substernal extension bilaterally with associated narrowing of the trachea at the level of the thoracic inlet. Multiple subcentimeter in short axis mediastinal nodes.    - TSH wnl. f/u T4, T3

## 2024-02-12 NOTE — ED ADULT NURSE NOTE - OBJECTIVE STATEMENT
Facilitator RN- Pt received awake and alert, unknown Phx. Medication record shows pt on Xarelto but family does not known why. Pt found to be in Afib in ED, pt appears tachypneic, placed on monitor sating at 89% RA, pt placed on 5L 02. Pt noted to be in Afib on monitor as well. IV placed, pt medicated for a rate control as ordered. EKG obtained, pt HR normalizing. Labs sent, Pt awaiting lab results and disposition. Area RN made aware, comfort measures provided, call bell in reach.

## 2024-02-12 NOTE — ED ADULT TRIAGE NOTE - INTERPRETER'S NAME
Received voicemail from patient requesting a return call to [phone].    Returned call and spoke to patient who is aware that he is due for an office cystoscopy and we are unable to accommodate this with Dr. Aleman. Offered for patient to see Dr. Lopez on 12/18/18 at 0930 for cystoscopy and patient would like to do this. Appointment rescheduled. Patient will call with any questions in the meantime.    Charity Hahn RN, BSN       [place]

## 2024-02-12 NOTE — H&P ADULT - NSICDXPASTMEDICALHX_GEN_ALL_CORE_FT
PAST MEDICAL HISTORY:  A-fib     HLD (hyperlipidemia)     HTN (hypertension)     Nodular goiter

## 2024-02-12 NOTE — ED ADULT NURSE NOTE - NSFALLUNIVINTERV_ED_ALL_ED
Bed/Stretcher in lowest position, wheels locked, appropriate side rails in place/Call bell, personal items and telephone in reach/Instruct patient to call for assistance before getting out of bed/chair/stretcher/Non-slip footwear applied when patient is off stretcher/[place] to call system/Physically safe environment - no spills, clutter or unnecessary equipment/Purposeful proactive rounding/Room/bathroom lighting operational, light cord in reach

## 2024-02-12 NOTE — CONSULT NOTE ADULT - ATTENDING COMMENTS
The patient was seen and examined with the Cardiology Consultation Teaching Service.   We spoke with the patient's son at bedside.    She is an 83-year-old woman with atrial fibrillation, hypertension and dyslipidemia who presented with about ten days of worsening fatigue, dyspnea, and anorexia.     The patient's exertional capacity at this time is a few steps prior to becoming symptomatic, which is a significant change from previously--she was recently in [place] a few months ago and was able to tolerate much more walking and activity. She has not been seen by her cardiologist in some time.     In the emergency room, she was placed on non-invasive ventilation after being notably hypoxic and in atrial fibrillation with rapid ventricular response.    PMH/PSH:  Atrial fibrillation on rivaroxaban  Hypertension  Dyslipidemia  Goiter    Comfortable-appearing woman in no acute distress  Alert and oriented  Afebrile  Tachycardia 130s  JVP is difficult to assess with non-invasive ventilation  Clear lungs  Irregularly irregular  Normal heart sounds  Extremities are warm and perfused  No peripheral edema     Leukocytosis 10K  PT 14, INR 1.3  Hypochloremia 95  Metabolic acidosis 20, AG 20  GFR 65  Mild AST elevation    hs-troponin 29  pro-BNP 16K    Lactate 3.0, 2.9    Chest CT without pulmonary embolism, moderate bilateral pleural effusions and probable multisegmental atelectasis involving the right middle lobe and bilateral lower lobes    POCUS appears to demonstrate severe LV systolic dysfunction and significant AI    Impression and Recommendations:   83-year-old woman with atrial fibrillation, hypertension and dyslipidemia who presented with about ten days of worsening fatigue, dyspnea, and anorexia found to have significant hypoxia, likely pulmonary congestion and atrial fibrillation with rapid ventricular response concerning for acute decompensated heart failure.    Continue non-invasive ventilation and respiratory support. Consider transfer to the CCU if the patient's hemodynamics or clinical status changes. Please obtain formal echocardiography. Continue aggressive intravenous diuretics for a goal net negative 1-2L daily.     Would cautiously attempt rate control given her rapid atrial fibrillation, but would monitor closely given her likely low LV function. She appears to be warm and perfused on examination, and her blood pressure is currently stable.     Will continue to follow closely with you.  Please call cardiology with any acute changes in her clinical status.    Tony Swenson MD FACC FACP  Cardiology  [phone]

## 2024-02-12 NOTE — H&P ADULT - PROBLEM SELECTOR PLAN 6
Diet: regular, DASH/TLC  DVT ppx: home Xarelto  GI ppx: none    Dispo: pending Diet: regular, DASH/TLC  DVT ppx: home Xarelto  GI ppx: none    Code status: full code, as discussed with son at bedside on admission    Dispo: pending

## 2024-02-13 ENCOUNTER — RESULT REVIEW (OUTPATIENT)
Age: 84
End: 2024-02-13

## 2024-02-13 LAB
A1C WITH ESTIMATED AVERAGE GLUCOSE RESULT: 5.4 % — SIGNIFICANT CHANGE UP (ref 4–5.6)
ALBUMIN SERPL ELPH-MCNC: 3.5 G/DL — SIGNIFICANT CHANGE UP (ref 3.3–5)
ALP SERPL-CCNC: 65 U/L — SIGNIFICANT CHANGE UP (ref 40–120)
ALT FLD-CCNC: 17 U/L — SIGNIFICANT CHANGE UP (ref 4–33)
ANION GAP SERPL CALC-SCNC: 17 MMOL/L — HIGH (ref 7–14)
ANION GAP SERPL CALC-SCNC: 9 MMOL/L — SIGNIFICANT CHANGE UP (ref 7–14)
APTT BLD: 32 SEC — SIGNIFICANT CHANGE UP (ref 24.5–35.6)
AST SERPL-CCNC: 31 U/L — SIGNIFICANT CHANGE UP (ref 4–32)
BASOPHILS # BLD AUTO: 0.08 K/UL — SIGNIFICANT CHANGE UP (ref 0–0.2)
BASOPHILS NFR BLD AUTO: 0.8 % — SIGNIFICANT CHANGE UP (ref 0–2)
BILIRUB SERPL-MCNC: 1 MG/DL — SIGNIFICANT CHANGE UP (ref 0.2–1.2)
BLOOD GAS VENOUS COMPREHENSIVE RESULT: SIGNIFICANT CHANGE UP
BUN SERPL-MCNC: 22 MG/DL — SIGNIFICANT CHANGE UP (ref 7–23)
BUN SERPL-MCNC: 23 MG/DL — SIGNIFICANT CHANGE UP (ref 7–23)
CALCIUM SERPL-MCNC: 8.8 MG/DL — SIGNIFICANT CHANGE UP (ref 8.4–10.5)
CALCIUM SERPL-MCNC: 9.1 MG/DL — SIGNIFICANT CHANGE UP (ref 8.4–10.5)
CHLORIDE SERPL-SCNC: 102 MMOL/L — SIGNIFICANT CHANGE UP (ref 98–107)
CHLORIDE SERPL-SCNC: 98 MMOL/L — SIGNIFICANT CHANGE UP (ref 98–107)
CO2 SERPL-SCNC: 23 MMOL/L — SIGNIFICANT CHANGE UP (ref 22–31)
CO2 SERPL-SCNC: 29 MMOL/L — SIGNIFICANT CHANGE UP (ref 22–31)
CREAT SERPL-MCNC: 0.93 MG/DL — SIGNIFICANT CHANGE UP (ref 0.5–1.3)
CREAT SERPL-MCNC: 1 MG/DL — SIGNIFICANT CHANGE UP (ref 0.5–1.3)
DIGOXIN SERPL-MCNC: 6.4 NG/ML — CRITICAL HIGH (ref 0.8–2)
EGFR: 56 ML/MIN/1.73M2 — LOW
EGFR: 61 ML/MIN/1.73M2 — SIGNIFICANT CHANGE UP
EOSINOPHIL # BLD AUTO: 0.11 K/UL — SIGNIFICANT CHANGE UP (ref 0–0.5)
EOSINOPHIL NFR BLD AUTO: 1.1 % — SIGNIFICANT CHANGE UP (ref 0–6)
ESTIMATED AVERAGE GLUCOSE: 108 — SIGNIFICANT CHANGE UP
GLUCOSE BLDC GLUCOMTR-MCNC: 68 MG/DL — LOW (ref 70–99)
GLUCOSE SERPL-MCNC: 113 MG/DL — HIGH (ref 70–99)
GLUCOSE SERPL-MCNC: 66 MG/DL — LOW (ref 70–99)
HCT VFR BLD CALC: 43.7 % — SIGNIFICANT CHANGE UP (ref 34.5–45)
HGB BLD-MCNC: 13.7 G/DL — SIGNIFICANT CHANGE UP (ref 11.5–15.5)
IANC: 6.65 K/UL — SIGNIFICANT CHANGE UP (ref 1.8–7.4)
IMM GRANULOCYTES NFR BLD AUTO: 0.6 % — SIGNIFICANT CHANGE UP (ref 0–0.9)
INR BLD: 1.57 RATIO — HIGH (ref 0.85–1.18)
LACTATE SERPL-SCNC: 1.7 MMOL/L — SIGNIFICANT CHANGE UP (ref 0.5–2)
LYMPHOCYTES # BLD AUTO: 2.08 K/UL — SIGNIFICANT CHANGE UP (ref 1–3.3)
LYMPHOCYTES # BLD AUTO: 20.9 % — SIGNIFICANT CHANGE UP (ref 13–44)
MAGNESIUM SERPL-MCNC: 1.8 MG/DL — SIGNIFICANT CHANGE UP (ref 1.6–2.6)
MAGNESIUM SERPL-MCNC: 2 MG/DL — SIGNIFICANT CHANGE UP (ref 1.6–2.6)
MCHC RBC-ENTMCNC: 25.5 PG — LOW (ref 27–34)
MCHC RBC-ENTMCNC: 31.4 GM/DL — LOW (ref 32–36)
MCV RBC AUTO: 81.4 FL — SIGNIFICANT CHANGE UP (ref 80–100)
MONOCYTES # BLD AUTO: 0.97 K/UL — HIGH (ref 0–0.9)
MONOCYTES NFR BLD AUTO: 9.7 % — SIGNIFICANT CHANGE UP (ref 2–14)
NEUTROPHILS # BLD AUTO: 6.65 K/UL — SIGNIFICANT CHANGE UP (ref 1.8–7.4)
NEUTROPHILS NFR BLD AUTO: 66.9 % — SIGNIFICANT CHANGE UP (ref 43–77)
NRBC # BLD: 0 /100 WBCS — SIGNIFICANT CHANGE UP (ref 0–0)
NRBC # FLD: 0 K/UL — SIGNIFICANT CHANGE UP (ref 0–0)
PHOSPHATE SERPL-MCNC: 3.6 MG/DL — SIGNIFICANT CHANGE UP (ref 2.5–4.5)
PHOSPHATE SERPL-MCNC: 3.9 MG/DL — SIGNIFICANT CHANGE UP (ref 2.5–4.5)
PLATELET # BLD AUTO: 138 K/UL — LOW (ref 150–400)
POTASSIUM SERPL-MCNC: 4.1 MMOL/L — SIGNIFICANT CHANGE UP (ref 3.5–5.3)
POTASSIUM SERPL-MCNC: 4.3 MMOL/L — SIGNIFICANT CHANGE UP (ref 3.5–5.3)
POTASSIUM SERPL-SCNC: 4.1 MMOL/L — SIGNIFICANT CHANGE UP (ref 3.5–5.3)
POTASSIUM SERPL-SCNC: 4.3 MMOL/L — SIGNIFICANT CHANGE UP (ref 3.5–5.3)
PROT SERPL-MCNC: 6.1 G/DL — SIGNIFICANT CHANGE UP (ref 6–8.3)
PROTHROM AB SERPL-ACNC: 17.5 SEC — HIGH (ref 9.5–13)
RBC # BLD: 5.37 M/UL — HIGH (ref 3.8–5.2)
RBC # FLD: 16 % — HIGH (ref 10.3–14.5)
SODIUM SERPL-SCNC: 138 MMOL/L — SIGNIFICANT CHANGE UP (ref 135–145)
SODIUM SERPL-SCNC: 140 MMOL/L — SIGNIFICANT CHANGE UP (ref 135–145)
T4 FREE SERPL-MCNC: 2.2 NG/DL — HIGH (ref 0.9–1.8)
WBC # BLD: 9.95 K/UL — SIGNIFICANT CHANGE UP (ref 3.8–10.5)
WBC # FLD AUTO: 9.95 K/UL — SIGNIFICANT CHANGE UP (ref 3.8–10.5)

## 2024-02-13 PROCEDURE — 93010 ELECTROCARDIOGRAM REPORT: CPT

## 2024-02-13 PROCEDURE — 99231 SBSQ HOSP IP/OBS SF/LOW 25: CPT

## 2024-02-13 PROCEDURE — 76376 3D RENDER W/INTRP POSTPROCES: CPT | Mod: 26

## 2024-02-13 PROCEDURE — 93306 TTE W/DOPPLER COMPLETE: CPT | Mod: 26

## 2024-02-13 PROCEDURE — 93356 MYOCRD STRAIN IMG SPCKL TRCK: CPT

## 2024-02-13 PROCEDURE — 99232 SBSQ HOSP IP/OBS MODERATE 35: CPT | Mod: GC

## 2024-02-13 RX ORDER — POTASSIUM CHLORIDE 20 MEQ
40 PACKET (EA) ORAL EVERY 4 HOURS
Refills: 0 | Status: DISCONTINUED | OUTPATIENT
Start: 2024-02-13 | End: 2024-02-13

## 2024-02-13 RX ORDER — MAGNESIUM SULFATE 500 MG/ML
1 VIAL (ML) INJECTION ONCE
Refills: 0 | Status: COMPLETED | OUTPATIENT
Start: 2024-02-13 | End: 2024-02-13

## 2024-02-13 RX ORDER — DIGOXIN 250 MCG
250 TABLET ORAL ONCE
Refills: 0 | Status: DISCONTINUED | OUTPATIENT
Start: 2024-02-13 | End: 2024-02-13

## 2024-02-13 RX ORDER — ACETAMINOPHEN 500 MG
650 TABLET ORAL EVERY 6 HOURS
Refills: 0 | Status: DISCONTINUED | OUTPATIENT
Start: 2024-02-13 | End: 2024-02-16

## 2024-02-13 RX ORDER — HEPARIN SODIUM 5000 [USP'U]/ML
4000 INJECTION INTRAVENOUS; SUBCUTANEOUS EVERY 6 HOURS
Refills: 0 | Status: DISCONTINUED | OUTPATIENT
Start: 2024-02-13 | End: 2024-02-13

## 2024-02-13 RX ORDER — MAGNESIUM SULFATE 500 MG/ML
2 VIAL (ML) INJECTION ONCE
Refills: 0 | Status: DISCONTINUED | OUTPATIENT
Start: 2024-02-13 | End: 2024-02-13

## 2024-02-13 RX ORDER — HEPARIN SODIUM 5000 [USP'U]/ML
INJECTION INTRAVENOUS; SUBCUTANEOUS
Qty: 25000 | Refills: 0 | Status: DISCONTINUED | OUTPATIENT
Start: 2024-02-13 | End: 2024-02-13

## 2024-02-13 RX ORDER — HEPARIN SODIUM 5000 [USP'U]/ML
INJECTION INTRAVENOUS; SUBCUTANEOUS
Qty: 25000 | Refills: 0 | Status: DISCONTINUED | OUTPATIENT
Start: 2024-02-14 | End: 2024-02-14

## 2024-02-13 RX ORDER — DIGOXIN 250 MCG
250 TABLET ORAL EVERY 6 HOURS
Refills: 0 | Status: COMPLETED | OUTPATIENT
Start: 2024-02-13 | End: 2024-02-13

## 2024-02-13 RX ORDER — INFLUENZA VIRUS VACCINE 15; 15; 15; 15 UG/.5ML; UG/.5ML; UG/.5ML; UG/.5ML
0.7 SUSPENSION INTRAMUSCULAR ONCE
Refills: 0 | Status: DISCONTINUED | OUTPATIENT
Start: 2024-02-13 | End: 2024-02-16

## 2024-02-13 RX ORDER — HEPARIN SODIUM 5000 [USP'U]/ML
2000 INJECTION INTRAVENOUS; SUBCUTANEOUS EVERY 6 HOURS
Refills: 0 | Status: DISCONTINUED | OUTPATIENT
Start: 2024-02-13 | End: 2024-02-13

## 2024-02-13 RX ADMIN — Medication 250 MICROGRAM(S): at 05:25

## 2024-02-13 RX ADMIN — ATORVASTATIN CALCIUM 40 MILLIGRAM(S): 80 TABLET, FILM COATED ORAL at 21:59

## 2024-02-13 RX ADMIN — Medication 250 MICROGRAM(S): at 12:12

## 2024-02-13 RX ADMIN — Medication 650 MILLIGRAM(S): at 18:00

## 2024-02-13 RX ADMIN — Medication 650 MILLIGRAM(S): at 17:11

## 2024-02-13 RX ADMIN — Medication 40 MILLIGRAM(S): at 17:10

## 2024-02-13 RX ADMIN — RIVAROXABAN 15 MILLIGRAM(S): KIT at 17:12

## 2024-02-13 RX ADMIN — Medication 100 GRAM(S): at 10:59

## 2024-02-13 RX ADMIN — Medication 40 MILLIGRAM(S): at 05:25

## 2024-02-13 RX ADMIN — Medication 40 MILLIEQUIVALENT(S): at 11:00

## 2024-02-13 RX ADMIN — LOSARTAN POTASSIUM 50 MILLIGRAM(S): 100 TABLET, FILM COATED ORAL at 05:24

## 2024-02-13 NOTE — PROGRESS NOTE ADULT - ATTENDING COMMENTS
83-year-old woman with atrial fibrillation, hypertension and dyslipidemia who presented with about ten days of worsening fatigue, dyspnea, and anorexia found to have significant hypoxia, likely pulmonary congestion and atrial fibrillation with rapid ventricular response concerning for acute decompensated heart failure.    #Hypoxic resp failure iso likely ADHF  - Lasix 40 IV bid, trend I/O, daily weight , BMP bid  - Clinically improving however still on nasal cannula   - On ARB, Statin  - may start beta blocker tomorrow   - TTE completed, results pending  - May require invasive testing, will transition to hep gtt     #Afib RVR likely iso ADHF, or Afib RVR causing low CO  - Cards recs apprreciated  - hep gtt AC  - s/p dig load   - can consider beta blocker tmrw if remains euvolemic   - TFTs to rule out possible thyroid storm causing sx- wnl     #Lactate  -, likely due to ADHF, improved     Rest as above

## 2024-02-13 NOTE — PROGRESS NOTE ADULT - ASSESSMENT
82 yo F w PMHx HTN, HLD, A-fib on Xarelto, adenomatous nodular goiter presenting to the ED with fatigue, SOB, with clinical evidence of volume overload concerning for acute decompensated heart failure. Chest CTA was negative for a PE but with bilateral pleural effusions.     #Acute decompensated heart failure. Patient with worsening subjective dyspnea, volume overloaded on exam with JVD to 12cm, bilateral lung crackles, 1-2+ LE edema and BNP 15,911    - Agree with IV lasix 40mg BID  - Continue home losartan for afterload reduction  - TTE  - Hold off on amlodipine, for now, can add PO hydralazine as needed for optimal BP control  - Repeat lactate in 4 hours  - Strict I/Os, daily standing weights  - Fluid restriction 1.5L /day    #Atrial Fibrillation ([id number] = 4 )   - Would load digoxin as follows: IV digoxin 500mcg now followed 250mcg Q6 hours for a total of 1mg load  - Xarelto 15mg daily if CrCl 15-50  - Keep K > 4 and Mg >2  - Telemetry monitoring    Recommendations are preliminary until attending attestation.    Roselyn Murry MD  Cardiology Fellow

## 2024-02-13 NOTE — PROGRESS NOTE ADULT - SUBJECTIVE AND OBJECTIVE BOX
Subjective    Patient seen and examined at bedside.  No chest pain, shortness of breath, or palpitations            Telemetry review:     Current Meds:  atorvastatin 40 milliGRAM(s) Oral at bedtime  digoxin  Injectable 250 MICROGram(s) IV Push every 6 hours  furosemide   Injectable 40 milliGRAM(s) IV Push every 12 hours  losartan 50 milliGRAM(s) Oral daily  magnesium sulfate  IVPB 1 Gram(s) IV Intermittent once  potassium chloride    Tablet ER 40 milliEquivalent(s) Oral every 4 hours  rivaroxaban 15 milliGRAM(s) Oral with dinner  senna 2 Tablet(s) Oral at bedtime      Vitals:  T(F): 97.5 (02-13), Max: 98.6 (02-12)  HR: 75 (02-13) (75 - 133)  BP: 137/67 (02-13) (103/92 - 137/99)  RR: 20 (02-13)  SpO2: 100% (02-13)  I&O's Summary    12 Feb 2024 07:01  -  13 Feb 2024 07:00  --------------------------------------------------------  IN: 0 mL / OUT: 1500 mL / NET: -1500 mL        Physical Exam:  General: No acute distress; well appearing  Eyes: PERRL, EOMI, pink conjunctiva  HEENT: Normal oral mucosa  Cardiovascular: RRR, S1, S2, no murmurs, rubs, or gallops; no edema; no JVD  Respiratory: Clear to auscultation bilaterally, speaking full sentences  Gastrointestinal: soft, non-tender, non-distended with normal bowel sounds  Extremities: 2+ pulses, no clubbing; no joint deformity, or edema  Neurologic: AAOx3,  Non-focal  Skin: No rashes, ecchymoses, or cyanosis                          13.7   9.95  )-----------( 138      ( 13 Feb 2024 06:00 )             43.7     02-13    138  |  98  |  22  ----------------------------<  66<L>  4.3   |  23  |  0.93    Ca    9.1      13 Feb 2024 06:00  Phos  3.9     02-13  Mg     1.80     02-13    TPro  6.1  /  Alb  3.5  /  TBili  1.0  /  DBili  x   /  AST  31  /  ALT  17  /  AlkPhos  65  02-13    PT/INR - ( 12 Feb 2024 11:48 )   PT: 14.1 sec;   INR: 1.27 ratio      PTT - ( 12 Feb 2024 11:48 )  PTT:33.4 sec  CARDIAC MARKERS ( 12 Feb 2024 11:48 )  29 ng/L / x     / x     / x     / x     / x       Subjective    Patient seen and examined at bedside.  No chest pain, shortness of breath, or palpitations            Current Meds:  atorvastatin 40 milliGRAM(s) Oral at bedtime  digoxin  Injectable 250 MICROGram(s) IV Push every 6 hours  furosemide   Injectable 40 milliGRAM(s) IV Push every 12 hours  losartan 50 milliGRAM(s) Oral daily  magnesium sulfate  IVPB 1 Gram(s) IV Intermittent once  potassium chloride    Tablet ER 40 milliEquivalent(s) Oral every 4 hours  rivaroxaban 15 milliGRAM(s) Oral with dinner  senna 2 Tablet(s) Oral at bedtime      Vitals:  T(F): 97.5 (02-13), Max: 98.6 (02-12)  HR: 75 (02-13) (75 - 133)  BP: 137/67 (02-13) (103/92 - 137/99)  RR: 20 (02-13)  SpO2: 100% (02-13)  I&O's Summary    12 Feb 2024 07:01  -  13 Feb 2024 07:00  --------------------------------------------------------  IN: 0 mL / OUT: 1500 mL / NET: -1500 mL        Physical Exam:  General: No acute distress; well appearing  Eyes: PERRL, EOMI, pink conjunctiva  HEENT: Normal oral mucosa  Cardiovascular: RRR, S1, S2, no murmurs, rubs, or gallops; no edema; no JVD  Respiratory: Clear to auscultation bilaterally, speaking full sentences  Gastrointestinal: soft, non-tender, non-distended with normal bowel sounds  Extremities: 2+ pulses, no clubbing; no joint deformity, or edema  Neurologic: AAOx3,  Non-focal  Skin: No rashes, ecchymoses, or cyanosis                          13.7   9.95  )-----------( 138      ( 13 Feb 2024 06:00 )             43.7     02-13    138  |  98  |  22  ----------------------------<  66<L>  4.3   |  23  |  0.93    Ca    9.1      13 Feb 2024 06:00  Phos  3.9     02-13  Mg     1.80     02-13    TPro  6.1  /  Alb  3.5  /  TBili  1.0  /  DBili  x   /  AST  31  /  ALT  17  /  AlkPhos  65  02-13    PT/INR - ( 12 Feb 2024 11:48 )   PT: 14.1 sec;   INR: 1.27 ratio      PTT - ( 12 Feb 2024 11:48 )  PTT:33.4 sec  CARDIAC MARKERS ( 12 Feb 2024 11:48 )  29 ng/L / x     / x     / x     / x     / x

## 2024-02-13 NOTE — PATIENT PROFILE ADULT - LEGAL HELP
[FreeTextEntry1] : right leg pain [de-identified] : right leg pain\par - persistent over the last 7 months\par - right anterior pain and numbness \par - was evaluated by Rheum, tx w/ meloxicam; uses it at night to help her sleep\par - may be related to past injury when she fell at her parent's house cleaning their yard. was pinned between retaining wall and shed\par - has tried chiropractor and stretching w/o improvement\par - was seen by PT but was not satisfied w/ facility, focused mainly on geriatric patients\par - in the past had MRI L spine which showed DJD. \par \par nasal irritation\par - feels painful/burning sensation\par - was evaluated by ENT dx as nasal rhinitis/vestibulitis and tx w/ mupirocin which did not help much\par  no

## 2024-02-13 NOTE — PROGRESS NOTE ADULT - ATTENDING COMMENTS
The patient was seen and examined with the Cardiology Consultation Teaching Service.     No overnight events    No chest pain  No dyspnea, orthopnea or PND  No palpitations or dizziness     Tachycardia has resolved  The patient no longer requires BIPAP    PMH/PSH:  Atrial fibrillation on rivaroxaban  Hypertension  Dyslipidemia  Goiter    Comfortable-appearing woman in no acute distress  Alert and oriented  Afebrile  Vital signs stable  JVP is elevated  Improving rales  Irregularly irregular  Possible soft diastolic murmur  Extremities are warm and perfused  Mild peripheral edema    Thrombocytopenia 138K  GFR 61    hs-troponin 29  pro-BNP 16K    Lactate 3.0, 2.9    Echocardiography is not officially read, but appears to demonstrate significant LV dysfunction and aortic valve insufficiency    Impression and Recommendations:   83-year-old woman with atrial fibrillation, hypertension and dyslipidemia who presented with about ten days of worsening fatigue, dyspnea, and anorexia found to have significant hypoxia, likely pulmonary congestion and atrial fibrillation with rapid ventricular response concerning for acute decompensated heart failure.    The patient is clinically improving. Awaiting the official echocardiography report, but will need to discuss the findings with the patient and her son regarding next further steps. VALARIE may be indicated to further evaluate the valve if the patient and her family are open to surgical interventions if indicated.     Continue intravenous diuresis for a goal net negative 1-2L daily.  Continue digoxin. If the patient remains euvolemic, I would start metoprolol succinate in the morning.   Continue losartan.    Can continue rivaroxaban for the time being, but given her echocardiography findings, it may be necessary to stop this medication for further invasive testing.    Tony Swenson MD FAC FACP  Cardiology  [phone]

## 2024-02-13 NOTE — PROGRESS NOTE ADULT - PROBLEM SELECTOR PLAN 6
Diet: regular, DASH/TLC  DVT ppx: home Xarelto  GI ppx: none    Code status: full code, as discussed with son at bedside on admission    Dispo: pending

## 2024-02-13 NOTE — PROGRESS NOTE ADULT - PROBLEM SELECTOR PLAN 2
Per chart from 2018 used to be on Digoxin but not currently.  On Xarelto 20mg qd at home.    - [id number] = 4   - c/w Xarelto 15mg qd (for Cr clearance ~45, if CrCl > 50 back to 20mg)  - avoiding BBs and CCBs i/s/o likely ADHF  - start Digoxin load 500mcg IV then 250mcg q6 for total of 1mg load Per chart from 2018 used to be on Digoxin but not currently.  On Xarelto 20mg qd at home.    - [id number] = 4   - c/w Xarelto 15mg qd (for Cr clearance ~45, if CrCl > 50 back to 20mg)  - avoiding BBs and CCBs i/s/o likely ADHF  - s/p Digoxin load 1mg total 2/13

## 2024-02-13 NOTE — PROGRESS NOTE ADULT - PROBLEM SELECTOR PLAN 4
- f/u lipid profile, a1c  - c/w home Lipitor 40mg qhs - f/u lipid profile. A1c 5.4%.  - c/w home Lipitor 40mg qhs

## 2024-02-13 NOTE — PROGRESS NOTE ADULT - ATTENDING COMMENTS
The patient was seen and examined with the Cardiology Consultation Teaching Service.     No overnight events    No chest pain  No dyspnea, orthopnea or PND  No palpitations or dizziness     Tachycardia has resolved  The patient no longer requires BIPAP    PMH/PSH:  Atrial fibrillation on rivaroxaban  Hypertension  Dyslipidemia  Goiter    Comfortable-appearing woman in no acute distress  Alert and oriented  Afebrile  Vital signs stable  JVP is elevated  Improving rales  Irregularly irregular  Possible soft diastolic murmur  Extremities are warm and perfused  Mild peripheral edema    Thrombocytopenia 138K  GFR 61    hs-troponin 29  pro-BNP 16K    Lactate 3.0, 2.9    Echocardiography is not officially read, but appears to demonstrate significant LV dysfunction and aortic valve insufficiency    Impression and Recommendations:   83-year-old woman with atrial fibrillation, hypertension and dyslipidemia who presented with about ten days of worsening fatigue, dyspnea, and anorexia found to have significant hypoxia, likely pulmonary congestion and atrial fibrillation with rapid ventricular response concerning for acute decompensated heart failure.    The patient is clinically improving. Awaiting the official echocardiography report, but will need to discuss the findings with the patient and her son regarding next further steps. VALARIE may be indicated to further evaluate the valve if the patient and her family are open to surgical interventions if indicated.     Continue intravenous diuresis for a goal net negative 1-2L daily.  Continue digoxin. If the patient remains euvolemic, I would start metoprolol succinate in the morning.   Continue losartan.    Can continue rivaroxaban for the time being, but given her echocardiography findings, it may be necessary to stop this medication for further invasive testing.    Tony Swenson MD FAC FACP  Cardiology  [phone] Please see my addendum to the note from later in the day.    Tony Swenson MD [facility] FACP  Cardiology  [phone]

## 2024-02-13 NOTE — PROGRESS NOTE ADULT - SUBJECTIVE AND OBJECTIVE BOX
History obtain with  [facility]  ID# [id number]    Subjective    Overall feels much better, with no shortness of breath or chest pain that are all much improved from yesterday    Current Meds:  acetaminophen     Tablet .. 650 milliGRAM(s) Oral every 6 hours PRN  atorvastatin 40 milliGRAM(s) Oral at bedtime  furosemide   Injectable 40 milliGRAM(s) IV Push every 12 hours  influenza  Vaccine (HIGH DOSE) 0.7 milliLiter(s) IntraMuscular once  losartan 50 milliGRAM(s) Oral daily  rivaroxaban 15 milliGRAM(s) Oral with dinner  senna 2 Tablet(s) Oral at bedtime    Vitals:  T(F): 98.2 (02-13), Max: 98.2 (02-13)  HR: 78 (02-13) (68 - 120)  BP: 134/72 (02-13) (105/78 - 139/89)  RR: 16 (02-13)  SpO2: 100% (02-13)  I&O's Summary    12 Feb 2024 07:01  -  13 Feb 2024 07:00  --------------------------------------------------------  IN: 0 mL / OUT: 1500 mL / NET: -1500 mL    13 Feb 2024 07:01  -  13 Feb 2024 17:47  --------------------------------------------------------  IN: 400 mL / OUT: 410 mL / NET: -10 mL    Physical Exam:  General: No acute distress; well appearing  Eyes: PERRL, EOMI, pink conjunctiva  HEENT: Normal oral mucosa  Cardiovascular: RRR, S1, S2, no murmurs, rubs, or gallops; no edema; no JVD  Respiratory: Clear to auscultation bilaterally, speaking full sentences  Gastrointestinal: soft, non-tender, non-distended with normal bowel sounds  Extremities: 2+ pulses, no clubbing; no joint deformity, or edema  Neurologic: AAOx3,  Non-focal  Skin: No rashes, ecchymoses, or cyanosis                          13.7   9.95  )-----------( 138      ( 13 Feb 2024 06:00 )             43.7     02-13    140  |  102  |  23  ----------------------------<  113<H>  4.1   |  29  |  1.00    Ca    8.8      13 Feb 2024 15:55  Phos  3.6     02-13  Mg     2.00     02-13    TPro  6.1  /  Alb  3.5  /  TBili  1.0  /  DBili  x   /  AST  31  /  ALT  17  /  AlkPhos  65  02-13    PT/INR - ( 12 Feb 2024 11:48 )   PT: 14.1 sec;   INR: 1.27 ratio      PTT - ( 12 Feb 2024 11:48 )  PTT:33.4 sec  CARDIAC MARKERS ( 12 Feb 2024 11:48 )  29 ng/L / x     / x     / x     / x     / x

## 2024-02-13 NOTE — PROGRESS NOTE ADULT - PROBLEM SELECTOR PLAN 1
No known Hx of HF.  Admission: ProBNP 15.9k. Trop neg. VBG showing pH 7.35, pCO2 48, lactate 3.8 -> 2.9.  CXR showed small b/l pleural effusions/adjacent interstitial infiltrate/atelectasis. ED POCUS TTE showed severely decreased cardiac contractility, plethoric IVC, large b/l pleural effusions favoring CHF vs less likely PNA.  CTA chest showed no PE, mod size b/l pleural effusions (R > L) w R middle lobe.    - appreciate cards eval  - strict I&Os, daily weights, 1.5L/day fluid restriction  - wean O2 as tolerated  - trend BMP bid, VBG, lactate  - c/w lasix 40mg IV bid, goal net neg 1-2L/day  - f/u TTE No known Hx of HF.  Admission: ProBNP 15.9k. Trop neg. VBG showing pH 7.35, pCO2 48, lactate 3.8 -> 2.9.  CXR showed small b/l pleural effusions/adjacent interstitial infiltrate/atelectasis. ED POCUS TTE showed severely decreased cardiac contractility, plethoric IVC, large b/l pleural effusions favoring CHF vs less likely PNA.  CTA chest showed no PE, mod size b/l pleural effusions (R > L) w R middle lobe.    - appreciate cards eval  - strict I&Os, daily weights, 1.5L/day fluid restriction  - wean O2 as tolerated  - trend BMP bid, lactate  - c/w lasix 40mg IV bid, goal net neg 1-2L/day  - f/u TTE

## 2024-02-13 NOTE — PROGRESS NOTE ADULT - SUBJECTIVE AND OBJECTIVE BOX
PROGRESS NOTE:     Patient is a 83y old  Female who presents with a chief complaint of Acute decompensated heart failure (12 Feb 2024 17:43)      SUBJECTIVE / OVERNIGHT EVENTS:    Pain:  Bowel Movements:  Urination:  OOB:  PT:    REVIEW OF SYSTEMS:    CONSTITUTIONAL: No weakness, fevers or chills  EYES/ENT: No visual changes;  No vertigo or throat pain   NECK: No pain or stiffness  RESPIRATORY: No cough, wheezing, hemoptysis; No shortness of breath  CARDIOVASCULAR: No chest pain or palpitations  GASTROINTESTINAL: No abdominal or epigastric pain. No nausea, vomiting, or hematemesis; No diarrhea or constipation. No melena or hematochezia.  GENITOURINARY: No dysuria, frequency or hematuria  NEUROLOGICAL: No numbness or weakness  SKIN: No itching, rashes      MEDICATIONS  (STANDING):  atorvastatin 40 milliGRAM(s) Oral at bedtime  digoxin  Injectable 250 MICROGram(s) IV Push every 6 hours  furosemide   Injectable 40 milliGRAM(s) IV Push every 12 hours  losartan 50 milliGRAM(s) Oral daily  rivaroxaban 15 milliGRAM(s) Oral with dinner  senna 2 Tablet(s) Oral at bedtime    MEDICATIONS  (PRN):      CAPILLARY BLOOD GLUCOSE        I&O's Summary    12 Feb 2024 07:01  -  13 Feb 2024 07:00  --------------------------------------------------------  IN: 0 mL / OUT: 1500 mL / NET: -1500 mL        VITALS:   T(C): 36.4 (02-13-24 @ 05:18), Max: 37 (02-12-24 @ 12:48)  HR: 97 (02-13-24 @ 05:18) (97 - 133)  BP: 119/82 (02-13-24 @ 05:18) (103/92 - 137/99)  RR: 20 (02-13-24 @ 05:18) (16 - 25)  SpO2: 100% (02-13-24 @ 05:18) (89% - 100%)    GENERAL: NAD, lying in bed comfortably  HEAD:  Atraumatic, normocephalic  EYES: EOMI, PERRLA, conjunctiva and sclera clear  ENT: Moist mucous membranes  NECK: Supple, no JVD  HEART: Regular rate and rhythm, no murmurs, rubs, or gallops  LUNGS: Unlabored respirations.  Clear to auscultation bilaterally, no crackles, wheezing, or rhonchi  ABDOMEN: Soft, nontender, nondistended, +BS  EXTREMITIES: 2+ peripheral pulses bilaterally. No clubbing, cyanosis, or edema  NERVOUS SYSTEM:  A&Ox3, no focal deficits   SKIN: No rashes or lesions    LABS:                        13.7   9.95  )-----------( 138      ( 13 Feb 2024 06:00 )             43.7     02-12    140  |  97<L>  |  24<H>  ----------------------------<  101<H>  3.4<L>   |  28  |  0.92    Ca    9.4      12 Feb 2024 20:53  Phos  4.0     02-12  Mg     1.80     02-12    TPro  8.0  /  Alb  4.4  /  TBili  1.1  /  DBili  x   /  AST  47<H>  /  ALT  24  /  AlkPhos  70  02-12    PT/INR - ( 12 Feb 2024 11:48 )   PT: 14.1 sec;   INR: 1.27 ratio         PTT - ( 12 Feb 2024 11:48 )  PTT:33.4 sec      Urinalysis Basic - ( 12 Feb 2024 20:53 )    Color: x / Appearance: x / SG: x / pH: x  Gluc: 101 mg/dL / Ketone: x  / Bili: x / Urobili: x   Blood: x / Protein: x / Nitrite: x   Leuk Esterase: x / RBC: x / WBC x   Sq Epi: x / Non Sq Epi: x / Bacteria: x          RADIOLOGY & ADDITIONAL TESTS:  Results Reviewed:   Imaging Personally Reviewed:  Electrocardiogram Personally Reviewed:    COORDINATION OF CARE:  Care Discussed with Consultants/Other Providers [Y/N]:  Prior or Outpatient Records Reviewed [Y/N]:   PROGRESS NOTE:     Patient is a 83y old  Female who presents with a chief complaint of Acute decompensated heart failure (12 Feb 2024 17:43)      SUBJECTIVE / OVERNIGHT EVENTS:  Pt started Digoxin load overnight, rate better this AM. Had 8 beats non-sustained V tach, VSS, asymptomatic.  Patient seen and examined at bedside this AM with son on phone for translation. Pt denies SOB, chest pain, abd pain, headache. dizziness, is urinating well, feeling well overall.    REVIEW OF SYSTEMS:    General: denies fever, chills, sweats. poor appetite, fatigue  HEENT: denies headache, dizziness, changes in vision/blurriness  Cardio: denies chest pain, palpitations, orthopnea, PND, + mild leg swelling  Resp: + prior SOB, denies cough, rhinorrhea, hemoptysis, wheezing  GI: denies abdominal pain, nausea, vomiting, diarrhea, constipation, dysphagia, hematemesis, hematochezia, melena  : denies dysuria, frequency, hematuria, flank pain  Neuro: denies headache, dizziness, change in level of consciousness, numbness, paraesthesia, seizures    MEDICATIONS  (STANDING):  atorvastatin 40 milliGRAM(s) Oral at bedtime  digoxin  Injectable 250 MICROGram(s) IV Push every 6 hours  furosemide   Injectable 40 milliGRAM(s) IV Push every 12 hours  losartan 50 milliGRAM(s) Oral daily  rivaroxaban 15 milliGRAM(s) Oral with dinner  senna 2 Tablet(s) Oral at bedtime    MEDICATIONS  (PRN):      CAPILLARY BLOOD GLUCOSE        I&O's Summary    12 Feb 2024 07:01  -  13 Feb 2024 07:00  --------------------------------------------------------  IN: 0 mL / OUT: 1500 mL / NET: -1500 mL        VITALS:   T(C): 36.4 (02-13-24 @ 05:18), Max: 37 (02-12-24 @ 12:48)  HR: 97 (02-13-24 @ 05:18) (97 - 133)  BP: 119/82 (02-13-24 @ 05:18) (103/92 - 137/99)  RR: 20 (02-13-24 @ 05:18) (16 - 25)  SpO2: 100% (02-13-24 @ 05:18) (89% - 100%)    HEENT: NC/AT. Sclera clear, no conjunctival pallor. EOMI, PERRLA. No nasal discharge. Throat: no erythema, no exudates on tonsils, uvula midline.  Cardiac: irregular rhythm, mildly tachycardic, +S1/S2. No murmurs, rubs, or gallops.   Chest: Crackles (minimal) at lung bases b/l. No wheezing.  Abdomen: Soft, nontender, mildly distended. No guarding, no rebound tenderness.  Neuro: Alert and oriented x3. Motor strength and sensation intact.    Extremities: No rashes, no bruising. No joint swelling. Trace pitting edema in b/l LE. Good peripheral pulses bilaterally.    LABS:                        13.7   9.95  )-----------( 138      ( 13 Feb 2024 06:00 )             43.7     02-12    140  |  97<L>  |  24<H>  ----------------------------<  101<H>  3.4<L>   |  28  |  0.92    Ca    9.4      12 Feb 2024 20:53  Phos  4.0     02-12  Mg     1.80     02-12    TPro  8.0  /  Alb  4.4  /  TBili  1.1  /  DBili  x   /  AST  47<H>  /  ALT  24  /  AlkPhos  70  02-12    PT/INR - ( 12 Feb 2024 11:48 )   PT: 14.1 sec;   INR: 1.27 ratio         PTT - ( 12 Feb 2024 11:48 )  PTT:33.4 sec      Urinalysis Basic - ( 12 Feb 2024 20:53 )    Color: x / Appearance: x / SG: x / pH: x  Gluc: 101 mg/dL / Ketone: x  / Bili: x / Urobili: x   Blood: x / Protein: x / Nitrite: x   Leuk Esterase: x / RBC: x / WBC x   Sq Epi: x / Non Sq Epi: x / Bacteria: x

## 2024-02-13 NOTE — PROGRESS NOTE ADULT - PROBLEM SELECTOR PLAN 5
Pt s/p FNA in 02/2018 for thyroid nodules, path came back benign, adenomatous nodular goiter.  Currently not grossly enlarged on physical exam.  CTA chest 2/12: Enlarged, heterogeneous thyroid gland with substernal extension bilaterally with associated narrowing of the trachea at the level of the thoracic inlet. Multiple subcentimeter in short axis mediastinal nodes.    - TSH wnl. f/u T4, T3 Pt s/p FNA in 02/2018 for thyroid nodules, path came back benign, adenomatous nodular goiter.  Currently not grossly enlarged on physical exam.  CTA chest 2/12: Enlarged, heterogeneous thyroid gland with substernal extension bilaterally with associated narrowing of the trachea at the level of the thoracic inlet. Multiple subcentimeter in short axis mediastinal nodes.    - TSH wnl. T3 wnl. fT4 slightly elevated 2.2.  - outpatient f/u

## 2024-02-13 NOTE — PROGRESS NOTE ADULT - ASSESSMENT
84 yo F w PMHx HTN, HLD, A-fib on Xarelto, adenomatous nodular goiter presenting to the ED with fatigue, SOB, with clinical evidence of volume overload concerning for acute decompensated heart failure. Chest CTA was negative for a PE but with bilateral pleural effusions in the setting of rapid atrial fibrillation.     #Acute decompensated heart failure.  Patient with worsening subjective dyspnea, volume overloaded on exam with JVD to 12cm, bilateral lung crackles, 1-2+ LE edema and BNP 15,911    - IV lasix 40mg BID  - Losartan 50mg daily  - s/p TTE reviewed images suggestive of reduced LVEF  - Strict I/Os, daily standing weights  - Fluid restriction 1.5L /day    #Atrial Fibrillation ([id number] = 4 )   - S/p 1g Load of digoxin with control of HR to slow AF  - Start lopressor 12.5mg BID for rate control  - Stop xarelto and transition to heparin drip  - Keep K > 4 and Mg >2  - Telemetry monitoring    Roselyn Murry MD  Cardiology Fellow

## 2024-02-13 NOTE — PROGRESS NOTE ADULT - PROBLEM SELECTOR PLAN 3
home regimen: Losartan-HCTZ 50-12.5mg qd, amlodipine ?mg qd    - appreciate cards consult  - hold amlodipine, can add PO hydral if needed  - c/w home losartan for afterload reduction, holding HCTZ

## 2024-02-13 NOTE — PROGRESS NOTE ADULT - ASSESSMENT
84yo French speaking F w PMHx HTN, HLD, A-fib on Xarelto, adenomatous nodular goiter presenting to the ED with fatigue, SOB, and poor appetite for the past ~ 10 days. Prior to the recent onset of symptoms, son states pt was in relatively good health, able to walk and move around without any problem. Pt has not visited her cardiologist in several years. Pt found to have large b/l plefs, was briefly on BIPAP in ED weaned back to NC.  Pt admitted for likely acute decompensated (new-onset) heart failure and a-fib w RVR. Per cardio Dr. Chris Le, prefer admission to hospitalist w house cards to follow.

## 2024-02-14 LAB
ANION GAP SERPL CALC-SCNC: 12 MMOL/L — SIGNIFICANT CHANGE UP (ref 7–14)
ANION GAP SERPL CALC-SCNC: 12 MMOL/L — SIGNIFICANT CHANGE UP (ref 7–14)
APTT BLD: 30.7 SEC — SIGNIFICANT CHANGE UP (ref 24.5–35.6)
BUN SERPL-MCNC: 19 MG/DL — SIGNIFICANT CHANGE UP (ref 7–23)
BUN SERPL-MCNC: 21 MG/DL — SIGNIFICANT CHANGE UP (ref 7–23)
CALCIUM SERPL-MCNC: 8.4 MG/DL — SIGNIFICANT CHANGE UP (ref 8.4–10.5)
CALCIUM SERPL-MCNC: 8.4 MG/DL — SIGNIFICANT CHANGE UP (ref 8.4–10.5)
CHLORIDE SERPL-SCNC: 98 MMOL/L — SIGNIFICANT CHANGE UP (ref 98–107)
CHLORIDE SERPL-SCNC: 99 MMOL/L — SIGNIFICANT CHANGE UP (ref 98–107)
CHOLEST SERPL-MCNC: 137 MG/DL — SIGNIFICANT CHANGE UP
CO2 SERPL-SCNC: 28 MMOL/L — SIGNIFICANT CHANGE UP (ref 22–31)
CO2 SERPL-SCNC: 30 MMOL/L — SIGNIFICANT CHANGE UP (ref 22–31)
CREAT SERPL-MCNC: 0.95 MG/DL — SIGNIFICANT CHANGE UP (ref 0.5–1.3)
CREAT SERPL-MCNC: 1 MG/DL — SIGNIFICANT CHANGE UP (ref 0.5–1.3)
EGFR: 56 ML/MIN/1.73M2 — LOW
EGFR: 59 ML/MIN/1.73M2 — LOW
GLUCOSE SERPL-MCNC: 103 MG/DL — HIGH (ref 70–99)
GLUCOSE SERPL-MCNC: 75 MG/DL — SIGNIFICANT CHANGE UP (ref 70–99)
HCT VFR BLD CALC: 42 % — SIGNIFICANT CHANGE UP (ref 34.5–45)
HDLC SERPL-MCNC: 49 MG/DL — LOW
HGB BLD-MCNC: 13.3 G/DL — SIGNIFICANT CHANGE UP (ref 11.5–15.5)
LACTATE SERPL-SCNC: 0.9 MMOL/L — SIGNIFICANT CHANGE UP (ref 0.5–2)
LACTATE SERPL-SCNC: 1.2 MMOL/L — SIGNIFICANT CHANGE UP (ref 0.5–2)
LIPID PNL WITH DIRECT LDL SERPL: 71 MG/DL — SIGNIFICANT CHANGE UP
MAGNESIUM SERPL-MCNC: 1.9 MG/DL — SIGNIFICANT CHANGE UP (ref 1.6–2.6)
MAGNESIUM SERPL-MCNC: 2.3 MG/DL — SIGNIFICANT CHANGE UP (ref 1.6–2.6)
MCHC RBC-ENTMCNC: 25.4 PG — LOW (ref 27–34)
MCHC RBC-ENTMCNC: 31.7 GM/DL — LOW (ref 32–36)
MCV RBC AUTO: 80.3 FL — SIGNIFICANT CHANGE UP (ref 80–100)
NON HDL CHOLESTEROL: 88 MG/DL — SIGNIFICANT CHANGE UP
NRBC # BLD: 0 /100 WBCS — SIGNIFICANT CHANGE UP (ref 0–0)
NRBC # FLD: 0 K/UL — SIGNIFICANT CHANGE UP (ref 0–0)
PHOSPHATE SERPL-MCNC: 3.3 MG/DL — SIGNIFICANT CHANGE UP (ref 2.5–4.5)
PHOSPHATE SERPL-MCNC: 3.9 MG/DL — SIGNIFICANT CHANGE UP (ref 2.5–4.5)
PLATELET # BLD AUTO: 169 K/UL — SIGNIFICANT CHANGE UP (ref 150–400)
POTASSIUM SERPL-MCNC: 3.6 MMOL/L — SIGNIFICANT CHANGE UP (ref 3.5–5.3)
POTASSIUM SERPL-MCNC: 3.9 MMOL/L — SIGNIFICANT CHANGE UP (ref 3.5–5.3)
POTASSIUM SERPL-SCNC: 3.6 MMOL/L — SIGNIFICANT CHANGE UP (ref 3.5–5.3)
POTASSIUM SERPL-SCNC: 3.9 MMOL/L — SIGNIFICANT CHANGE UP (ref 3.5–5.3)
RBC # BLD: 5.23 M/UL — HIGH (ref 3.8–5.2)
RBC # FLD: 15.6 % — HIGH (ref 10.3–14.5)
SODIUM SERPL-SCNC: 138 MMOL/L — SIGNIFICANT CHANGE UP (ref 135–145)
SODIUM SERPL-SCNC: 141 MMOL/L — SIGNIFICANT CHANGE UP (ref 135–145)
TRIGL SERPL-MCNC: 83 MG/DL — SIGNIFICANT CHANGE UP
WBC # BLD: 7.97 K/UL — SIGNIFICANT CHANGE UP (ref 3.8–10.5)
WBC # FLD AUTO: 7.97 K/UL — SIGNIFICANT CHANGE UP (ref 3.8–10.5)

## 2024-02-14 PROCEDURE — 99231 SBSQ HOSP IP/OBS SF/LOW 25: CPT

## 2024-02-14 PROCEDURE — 99232 SBSQ HOSP IP/OBS MODERATE 35: CPT | Mod: GC

## 2024-02-14 RX ORDER — SACUBITRIL AND VALSARTAN 24; 26 MG/1; MG/1
1 TABLET, FILM COATED ORAL
Refills: 0 | Status: DISCONTINUED | OUTPATIENT
Start: 2024-02-14 | End: 2024-02-15

## 2024-02-14 RX ORDER — CHLORHEXIDINE GLUCONATE 213 G/1000ML
1 SOLUTION TOPICAL DAILY
Refills: 0 | Status: DISCONTINUED | OUTPATIENT
Start: 2024-02-14 | End: 2024-02-16

## 2024-02-14 RX ORDER — RIVAROXABAN 15 MG-20MG
15 KIT ORAL
Refills: 0 | Status: DISCONTINUED | OUTPATIENT
Start: 2024-02-14 | End: 2024-02-16

## 2024-02-14 RX ORDER — POTASSIUM CHLORIDE 20 MEQ
40 PACKET (EA) ORAL ONCE
Refills: 0 | Status: COMPLETED | OUTPATIENT
Start: 2024-02-14 | End: 2024-02-14

## 2024-02-14 RX ORDER — POTASSIUM CHLORIDE 20 MEQ
20 PACKET (EA) ORAL ONCE
Refills: 0 | Status: COMPLETED | OUTPATIENT
Start: 2024-02-14 | End: 2024-02-14

## 2024-02-14 RX ORDER — MAGNESIUM SULFATE 500 MG/ML
2 VIAL (ML) INJECTION ONCE
Refills: 0 | Status: COMPLETED | OUTPATIENT
Start: 2024-02-14 | End: 2024-02-14

## 2024-02-14 RX ORDER — METOPROLOL TARTRATE 50 MG
25 TABLET ORAL DAILY
Refills: 0 | Status: DISCONTINUED | OUTPATIENT
Start: 2024-02-14 | End: 2024-02-16

## 2024-02-14 RX ADMIN — ATORVASTATIN CALCIUM 40 MILLIGRAM(S): 80 TABLET, FILM COATED ORAL at 21:58

## 2024-02-14 RX ADMIN — Medication 40 MILLIGRAM(S): at 17:53

## 2024-02-14 RX ADMIN — Medication 40 MILLIGRAM(S): at 06:11

## 2024-02-14 RX ADMIN — LOSARTAN POTASSIUM 50 MILLIGRAM(S): 100 TABLET, FILM COATED ORAL at 06:11

## 2024-02-14 RX ADMIN — RIVAROXABAN 15 MILLIGRAM(S): KIT at 17:52

## 2024-02-14 RX ADMIN — Medication 25 GRAM(S): at 08:43

## 2024-02-14 RX ADMIN — Medication 25 MILLIGRAM(S): at 17:52

## 2024-02-14 RX ADMIN — SACUBITRIL AND VALSARTAN 1 TABLET(S): 24; 26 TABLET, FILM COATED ORAL at 17:51

## 2024-02-14 RX ADMIN — Medication 20 MILLIEQUIVALENT(S): at 21:58

## 2024-02-14 RX ADMIN — SENNA PLUS 2 TABLET(S): 8.6 TABLET ORAL at 21:58

## 2024-02-14 RX ADMIN — Medication 40 MILLIEQUIVALENT(S): at 08:52

## 2024-02-14 RX ADMIN — CHLORHEXIDINE GLUCONATE 1 APPLICATION(S): 213 SOLUTION TOPICAL at 13:46

## 2024-02-14 NOTE — PROGRESS NOTE ADULT - ASSESSMENT
82 yo F w PMHx HTN, HLD, A-fib on Xarelto, adenomatous nodular goiter presenting to the ED with fatigue, SOB, with clinical evidence of volume overload concerning for acute decompensated heart failure. Chest CTA was negative for a PE but with bilateral pleural effusions in the setting of rapid atrial fibrillation.    84 yo F w PMHx HTN, HLD, A-fib on Xarelto, adenomatous nodular goiter presenting to the ED with fatigue, SOB, with clinical evidence of volume overload concerning for acute decompensated heart failure. Chest CTA was negative for a PE but with bilateral pleural effusions in the setting of rapid atrial fibrillation and echo demonstrated severely reduced LV function with EF 20-25% with mid-moderate aortic regurgitation.       84 yo F w PMHx HTN, HLD, A-fib on Xarelto, adenomatous nodular goiter presenting to the ED with fatigue, SOB, with clinical evidence of volume overload concerning for acute decompensated heart failure. Chest CTA was negative for a PE but with bilateral pleural effusions in the setting of rapid atrial fibrillation.     #Acute decompensated heart failure.  LVEF 20-25% with mild-moderate AR  - Continue IV lasix 40mg BID  - Stop Losartan   - Start entresto 24/26mg BID  - Start toprolol 25mg daily  - Strict I/Os, daily standing weights  - Fluid restriction 1.5L /day    #Atrial Fibrillation ([id number] = 4 )   - S/p 1g Load of digoxin with control of HR to slow AF  - Toprolol 25mg daily  - Currently on xarelto for AC, will determine need for transition to heparin should LHC be done inpatient  - Keep K > 4 and Mg >2  - Telemetry monitoring    Roselyn Murry MD  Cardiology Fellow

## 2024-02-14 NOTE — PROGRESS NOTE ADULT - SUBJECTIVE AND OBJECTIVE BOX
Subjective    Patient seen and examined at bedside.  No chest pain, shortness of breath, or palpitations            Current Meds:  acetaminophen     Tablet .. 650 milliGRAM(s) Oral every 6 hours PRN  atorvastatin 40 milliGRAM(s) Oral at bedtime  furosemide   Injectable 40 milliGRAM(s) IV Push every 12 hours  heparin  Infusion.  Unit(s)/Hr IV Continuous <Continuous>  influenza  Vaccine (HIGH DOSE) 0.7 milliLiter(s) IntraMuscular once  losartan 50 milliGRAM(s) Oral daily  magnesium sulfate  IVPB 2 Gram(s) IV Intermittent once  potassium chloride   Powder 40 milliEquivalent(s) Oral once  senna 2 Tablet(s) Oral at bedtime    Vitals:  T(F): 98 (-), Max: 98.2 ()  HR: 77 () (66 - 81)  BP: 116/76 () (108/66 - 139/89)  RR: 18 ()  SpO2: 100% ()  I&O's Summary    2024 07:01  -  2024 07:00  --------------------------------------------------------  IN: 1100 mL / OUT: 700 mL / NET: 400 mL    Physical Exam:  General: No acute distress; well appearing  Eyes: PERRL, EOMI, pink conjunctiva  HEENT: Normal oral mucosa  Cardiovascular: RRR, S1, S2, no murmurs, rubs, or gallops; no edema; no JVD  Respiratory: Clear to auscultation bilaterally, speaking full sentences  Gastrointestinal: soft, non-tender, non-distended with normal bowel sounds  Extremities: 2+ pulses, no clubbing; no joint deformity, or edema  Neurologic: AAOx3,  Non-focal  Skin: No rashes, ecchymoses, or cyanosis                          13.3   7.97  )-----------( 169      ( 2024 06:31 )             42.0     -14    141  |  99  |  21  ----------------------------<  75  3.6   |  30  |  0.95    Ca    8.4      2024 06:31  Phos  3.9     02-14  Mg     1.90     02-14    TPro  6.1  /  Alb  3.5  /  TBili  1.0  /  DBili  x   /  AST  31  /  ALT  17  /  AlkPhos  65  02-13    PT/INR - ( 2024 18:03 )   PT: 17.5 sec;   INR: 1.57 ratio      PTT - ( 2024 06:31 )  PTT:30.7 sec  CARDIAC MARKERS ( 2024 11:48 )  29 ng/L / x     / x     / x     / x     / x        Total Cholesterol: 137  LDL: --  HDL: 49  T         Subjective    Patient seen and examined at bedside.  No chest pain, shortness of breath, or palpitations            Current Meds:  acetaminophen     Tablet .. 650 milliGRAM(s) Oral every 6 hours PRN  atorvastatin 40 milliGRAM(s) Oral at bedtime  furosemide   Injectable 40 milliGRAM(s) IV Push every 12 hours  heparin  Infusion.  Unit(s)/Hr IV Continuous <Continuous>  influenza  Vaccine (HIGH DOSE) 0.7 milliLiter(s) IntraMuscular once  losartan 50 milliGRAM(s) Oral daily  magnesium sulfate  IVPB 2 Gram(s) IV Intermittent once  potassium chloride   Powder 40 milliEquivalent(s) Oral once  senna 2 Tablet(s) Oral at bedtime    Vitals:  T(F): 98 (-), Max: 98.2 ()  HR: 77 () (66 - 81)  BP: 116/76 () (108/66 - 139/89)  RR: 18 ()  SpO2: 100% ()  I&O's Summary    2024 07:01  -  2024 07:00  --------------------------------------------------------  IN: 1100 mL / OUT: 700 mL / NET: 400 mL    Physical Exam:  General: No acute distress; well appearing  Eyes: PERRL, EOMI, pink conjunctiva  HEENT: Normal oral mucosa  Cardiovascular: RRR, S1, S2, no murmurs, rubs, or gallops; no edema; no JVD  Respiratory: Bibasilar crakles bilaterally, speaking full sentences  Gastrointestinal: soft, non-tender, non-distended with normal bowel sounds  Extremities: 2+ pulses, no clubbing; no joint deformity, or edema  Neurologic: AAOx3,  Non-focal  Skin: No rashes, ecchymoses, or cyanosis                          13.3   7.97  )-----------( 169      ( 2024 06:31 )             42.0     -    141  |  99  |  21  ----------------------------<  75  3.6   |  30  |  0.95    Ca    8.4      2024 06:31  Phos  3.9     02-14  Mg     1.90     02-14    TPro  6.1  /  Alb  3.5  /  TBili  1.0  /  DBili  x   /  AST  31  /  ALT  17  /  AlkPhos  65  02-13    PT/INR - ( 2024 18:03 )   PT: 17.5 sec;   INR: 1.57 ratio      PTT - ( 2024 06:31 )  PTT:30.7 sec  CARDIAC MARKERS ( 2024 11:48 )  29 ng/L / x     / x     / x     / x     / x        Total Cholesterol: 137  LDL: --  HDL: 49  T         CONCLUSIONS:      1. The left ventricular cavity is normal. Left ventricular wall thickness is normal. Left ventricular systolic function is severely decreased with an ejection fraction visually estimated at 20 to 25%. There is global left ventricular hypokinesis. There is no evidence of a left ventricular thrombus.   2. Enlarged right ventricular cavity size andreduced systolic function.   3. Structurally normal mitral valve with normal leaflet excursion. There is calcification of the mitral valve annulus. There is trace mitral regurgitation.   4. The aortic valve appears trileaflet with normal systolic excursion. There is calcification of the aortic valve leaflets. There is mild to moderate aortic regurgitation.   5. No prior echocardiogram is available for comparison.    ________________________________________________________________________________________  FINDINGS:     Left Ventricle:  The left ventricular cavity is normal. Left ventricular wall thickness is normal. Left ventricular systolic function is severely decreased with an ejection fraction visually estimated at 20 to 25%. There is global left ventricular hypokinesis. There is no evidence of a left ventricular thrombus.     Right Ventricle:  The right ventricular cavity is enlarged in size and reduced systolic function.     Left Atrium:  The left atrium is normal with an indexed volume of 30.99 ml/m².     Right Atrium:  The right atrium is normal in size with an indexed volume of 14.81 ml/m² and an indexed area of 6.84 cm²/m².     Aortic Valve:  The aortic valve appears trileaflet with normal systolic excursion. There is calcification of the aortic valve leaflets. There is mild to moderate aortic regurgitation.     Mitral Valve:  Structurally normal mitral valve with normal leaflet excursion. There is calcification of the mitral valve annulus. There is trace mitral regurgitation.     Tricuspid Valve:  Structurally normal tricuspid valve with normal leaflet excursion. There is mild tricuspid regurgitation.     Pulmonic Valve:  Structurally normal pulmonic valve with normal leaflet excursion. There is mild pulmonic regurgitation.     Aorta:  The aortic root at the sinuses of Valsalva is normal in size, measuring 3.00 cm (indexed 1.71 cm/m²). The ascending aorta diameter is normal in size, measuring 3.30 cm (indexed 1.88 cm/m²).     Pericardium:  There is a trace pericardial effusion.     Systemic Veins:  The inferior vena cava is normal in size measuring 1.60 cm in diameter, (normal <2.1cm) with normal inspiratory collapse (normal >50%) consistent with normal right atrial pressure (~3, range 0-5mmHg).  ____________________________________________________________________

## 2024-02-14 NOTE — PROGRESS NOTE ADULT - PROBLEM SELECTOR PLAN 1
No known Hx of HF.  Admission: ProBNP 15.9k. Trop neg. VBG showing pH 7.35, pCO2 48, lactate 3.8 -> 2.9.  CXR showed small b/l pleural effusions/adjacent interstitial infiltrate/atelectasis. ED POCUS TTE showed severely decreased cardiac contractility, plethoric IVC, large b/l pleural effusions favoring CHF vs less likely PNA.  CTA chest showed no PE, mod size b/l pleural effusions (R > L) w R middle lobe.    - appreciate cards eval  - strict I&Os, daily weights, 1.5L/day fluid restriction  - wean O2 as tolerated  - trend BMP bid, lactate  - c/w lasix 40mg IV bid, goal net neg 1-2L/day  - f/u TTE No known Hx of HF.  Admission: ProBNP 15.9k. Trop neg. VBG showing pH 7.35, pCO2 48, lactate 3.8 -> 2.9.  CXR showed small b/l pleural effusions/adjacent interstitial infiltrate/atelectasis. ED POCUS TTE showed severely decreased cardiac contractility, plethoric IVC, large b/l pleural effusions favoring CHF vs less likely PNA.  CTA chest showed no PE, mod size b/l pleural effusions (R > L) w R middle lobe.    - appreciate cards eval  - strict I&Os, daily weights, 1.5L/day fluid restriction  - wean O2 as tolerated  - trend BMP bid, lactate  - c/w lasix 40mg IV bid, goal net neg 1-2L/day  - TTE 2/13 showing No known Hx of HF.  Admission: ProBNP 15.9k. Trop neg. VBG showing pH 7.35, pCO2 48, lactate 3.8 -> 2.9.  CXR showed small b/l pleural effusions/adjacent interstitial infiltrate/atelectasis. ED POCUS TTE showed severely decreased cardiac contractility, plethoric IVC, large b/l pleural effusions favoring CHF vs less likely PNA.  CTA chest showed no PE, mod size b/l pleural effusions (R > L) w R middle lobe.    - appreciate cards eval  - TTE 2/13 showing EF 20-25%, global LV hypokinesis, mild-mod AR  - strict I&Os, daily weights, 1.5L/day fluid restriction  - wean O2 as tolerated  - c/w lasix 40mg IV bid, goal net neg 1-2 L/day  - start Entresto 24/26mg bid  - start Toprol 25mg qd  - deferring cath, reassess LV function outpatient

## 2024-02-14 NOTE — PHYSICAL THERAPY INITIAL EVALUATION ADULT - PERTINENT HX OF CURRENT PROBLEM, REHAB EVAL
82yo English speaking F w PMHx HTN, HLD, A-fib on Xarelto, adenomatous nodular goiter presenting to the ED with fatigue, SOB, and poor appetite for the past ~ 10 days. Prior to the recent onset of symptoms, son states pt was in relatively good health, able to walk and move around without any problem. Pt has not visited her cardiologist in several years. Pt found to have large b/l plefs, was briefly on BIPAP in ED weaned back to NC.  Pt admitted for likely acute decompensated (new-onset) heart failure and a-fib w RVR. Per cardio Dr. Chris Le, prefer admission to hospitalist w house cards to follow.

## 2024-02-14 NOTE — PROGRESS NOTE ADULT - PROBLEM SELECTOR PLAN 4
- f/u lipid profile. A1c 5.4%.  - c/w home Lipitor 40mg qhs Lipid profile wnl. A1c 5.4%.  - c/w home Lipitor 40mg qhs

## 2024-02-14 NOTE — PROGRESS NOTE ADULT - SUBJECTIVE AND OBJECTIVE BOX
PROGRESS NOTE:     Patient is a 83y old  Female who presents with a chief complaint of Acute decompensated heart failure (13 Feb 2024 17:46)      SUBJECTIVE / OVERNIGHT EVENTS:    Pain:  Bowel Movements:  Urination:  OOB:  PT:    REVIEW OF SYSTEMS:    CONSTITUTIONAL: No weakness, fevers or chills  EYES/ENT: No visual changes;  No vertigo or throat pain   NECK: No pain or stiffness  RESPIRATORY: No cough, wheezing, hemoptysis; No shortness of breath  CARDIOVASCULAR: No chest pain or palpitations  GASTROINTESTINAL: No abdominal or epigastric pain. No nausea, vomiting, or hematemesis; No diarrhea or constipation. No melena or hematochezia.  GENITOURINARY: No dysuria, frequency or hematuria  NEUROLOGICAL: No numbness or weakness  SKIN: No itching, rashes      MEDICATIONS  (STANDING):  atorvastatin 40 milliGRAM(s) Oral at bedtime  furosemide   Injectable 40 milliGRAM(s) IV Push every 12 hours  heparin  Infusion.  Unit(s)/Hr (10 mL/Hr) IV Continuous <Continuous>  influenza  Vaccine (HIGH DOSE) 0.7 milliLiter(s) IntraMuscular once  losartan 50 milliGRAM(s) Oral daily  senna 2 Tablet(s) Oral at bedtime    MEDICATIONS  (PRN):  acetaminophen     Tablet .. 650 milliGRAM(s) Oral every 6 hours PRN Temp greater or equal to 38C (100.4F), Mild Pain (1 - 3)      CAPILLARY BLOOD GLUCOSE      POCT Blood Glucose.: 68 mg/dL (13 Feb 2024 12:16)    I&O's Summary    13 Feb 2024 07:01  -  14 Feb 2024 07:00  --------------------------------------------------------  IN: 1100 mL / OUT: 700 mL / NET: 400 mL        VITALS:   T(C): 36.7 (02-14-24 @ 06:58), Max: 36.8 (02-13-24 @ 17:00)  HR: 77 (02-14-24 @ 06:58) (66 - 81)  BP: 116/76 (02-14-24 @ 06:58) (108/66 - 139/89)  RR: 18 (02-14-24 @ 06:58) (16 - 18)  SpO2: 100% (02-14-24 @ 06:58) (97% - 100%)    GENERAL: NAD, lying in bed comfortably  HEAD:  Atraumatic, normocephalic  EYES: EOMI, PERRLA, conjunctiva and sclera clear  ENT: Moist mucous membranes  NECK: Supple, no JVD  HEART: Regular rate and rhythm, no murmurs, rubs, or gallops  LUNGS: Unlabored respirations.  Clear to auscultation bilaterally, no crackles, wheezing, or rhonchi  ABDOMEN: Soft, nontender, nondistended, +BS  EXTREMITIES: 2+ peripheral pulses bilaterally. No clubbing, cyanosis, or edema  NERVOUS SYSTEM:  A&Ox3, no focal deficits   SKIN: No rashes or lesions    LABS:                        13.7   9.95  )-----------( 138      ( 13 Feb 2024 06:00 )             43.7     02-13    140  |  102  |  23  ----------------------------<  113<H>  4.1   |  29  |  1.00    Ca    8.8      13 Feb 2024 15:55  Phos  3.6     02-13  Mg     2.00     02-13    TPro  6.1  /  Alb  3.5  /  TBili  1.0  /  DBili  x   /  AST  31  /  ALT  17  /  AlkPhos  65  02-13    PT/INR - ( 13 Feb 2024 18:03 )   PT: 17.5 sec;   INR: 1.57 ratio         PTT - ( 13 Feb 2024 18:03 )  PTT:32.0 sec      Urinalysis Basic - ( 13 Feb 2024 15:55 )    Color: x / Appearance: x / SG: x / pH: x  Gluc: 113 mg/dL / Ketone: x  / Bili: x / Urobili: x   Blood: x / Protein: x / Nitrite: x   Leuk Esterase: x / RBC: x / WBC x   Sq Epi: x / Non Sq Epi: x / Bacteria: x          RADIOLOGY & ADDITIONAL TESTS:  Results Reviewed:   Imaging Personally Reviewed:  Electrocardiogram Personally Reviewed:    COORDINATION OF CARE:  Care Discussed with Consultants/Other Providers [Y/N]:  Prior or Outpatient Records Reviewed [Y/N]:   PROGRESS NOTE:     Patient is a 83y old  Female who presents with a chief complaint of Acute decompensated heart failure (13 Feb 2024 17:46)      SUBJECTIVE / OVERNIGHT EVENTS:  No acute events overnight. Patient seen and examined at bedside this AM with daughter-in-law at bedside. Pt denies any chest pain, SOB, leg swelling/pain. Feeling well.    REVIEW OF SYSTEMS:    General: denies fever, chills, sweats. poor appetite, fatigue  HEENT: denies headache, dizziness, changes in vision/blurriness  Cardio: denies chest pain, palpitations, orthopnea, PND, + mild leg swelling  Resp: + prior SOB, denies cough, rhinorrhea, hemoptysis, wheezing  GI: denies abdominal pain, nausea, vomiting, diarrhea, constipation, dysphagia, hematemesis, hematochezia, melena  : denies dysuria, frequency, hematuria, flank pain  Neuro: denies headache, dizziness, change in level of consciousness, numbness, paraesthesia, seizures      MEDICATIONS  (STANDING):  atorvastatin 40 milliGRAM(s) Oral at bedtime  furosemide   Injectable 40 milliGRAM(s) IV Push every 12 hours  heparin  Infusion.  Unit(s)/Hr (10 mL/Hr) IV Continuous <Continuous>  influenza  Vaccine (HIGH DOSE) 0.7 milliLiter(s) IntraMuscular once  losartan 50 milliGRAM(s) Oral daily  senna 2 Tablet(s) Oral at bedtime    MEDICATIONS  (PRN):  acetaminophen     Tablet .. 650 milliGRAM(s) Oral every 6 hours PRN Temp greater or equal to 38C (100.4F), Mild Pain (1 - 3)      CAPILLARY BLOOD GLUCOSE      POCT Blood Glucose.: 68 mg/dL (13 Feb 2024 12:16)    I&O's Summary    13 Feb 2024 07:01  -  14 Feb 2024 07:00  --------------------------------------------------------  IN: 1100 mL / OUT: 700 mL / NET: 400 mL        VITALS:   T(C): 36.7 (02-14-24 @ 06:58), Max: 36.8 (02-13-24 @ 17:00)  HR: 77 (02-14-24 @ 06:58) (66 - 81)  BP: 116/76 (02-14-24 @ 06:58) (108/66 - 139/89)  RR: 18 (02-14-24 @ 06:58) (16 - 18)  SpO2: 100% (02-14-24 @ 06:58) (97% - 100%)    GENERAL: NAD, lying in bed comfortably  HEENT: NC/AT. Sclera clear, no conjunctival pallor. EOMI, PERRLA. No nasal discharge. Throat: no erythema, no exudates on tonsils, uvula midline.  Cardiac: irregular rhythm, mildly tachycardic, +S1/S2. No murmurs, rubs, or gallops.   Chest: Crackles (minimal) at lung bases b/l. No wheezing.  Abdomen: Soft, nontender, mildly distended. No guarding, no rebound tenderness.  Neuro: Alert and oriented x3. Motor strength and sensation intact.    Extremities: No rashes, no bruising. No joint swelling. Minimal edema in b/l LE. Good peripheral pulses bilaterally.    LABS:                        13.7   9.95  )-----------( 138      ( 13 Feb 2024 06:00 )             43.7     02-13    140  |  102  |  23  ----------------------------<  113<H>  4.1   |  29  |  1.00    Ca    8.8      13 Feb 2024 15:55  Phos  3.6     02-13  Mg     2.00     02-13    TPro  6.1  /  Alb  3.5  /  TBili  1.0  /  DBili  x   /  AST  31  /  ALT  17  /  AlkPhos  65  02-13    PT/INR - ( 13 Feb 2024 18:03 )   PT: 17.5 sec;   INR: 1.57 ratio         PTT - ( 13 Feb 2024 18:03 )  PTT:32.0 sec      Urinalysis Basic - ( 13 Feb 2024 15:55 )    Color: x / Appearance: x / SG: x / pH: x  Gluc: 113 mg/dL / Ketone: x  / Bili: x / Urobili: x   Blood: x / Protein: x / Nitrite: x   Leuk Esterase: x / RBC: x / WBC x   Sq Epi: x / Non Sq Epi: x / Bacteria: x

## 2024-02-14 NOTE — PROGRESS NOTE ADULT - ATTENDING COMMENTS
83-year-old woman with atrial fibrillation, hypertension and dyslipidemia who presented with about ten days of worsening fatigue, dyspnea, and anorexia found to have significant hypoxia, likely pulmonary congestion and atrial fibrillation with rapid ventricular response concerning for acute decompensated heart failure.    #Hypoxic resp failure iso ADHF  Clinically improved   TTE showed severe LV hypokinesis globalled, EF 20-25 %  - Lasix 40 IV bid, trend I/O, daily weight , BMP bid  - Started entresto, beta blocker, on Statin  - Cards recs appreciated    #Afib RVR likely iso ADHF, or Afib RVR causing low CO  - Cards recs apprreciated  - DOAC  - s/p dig load now rate controlled     #Lactate  -, likely due to ADHF, improved     #Dispo  - home PT     Rest as above

## 2024-02-14 NOTE — PHYSICAL THERAPY INITIAL EVALUATION ADULT - NSPTDISCHREC_GEN_A_CORE
Home with home PT services to address current functional limitations to optimize safety within the home environment with the use of a rolling walker./Home PT

## 2024-02-14 NOTE — PROGRESS NOTE ADULT - PROBLEM SELECTOR PLAN 2
Per chart from 2018 used to be on Digoxin but not currently.  On Xarelto 20mg qd at home.    - [id number] = 4   - c/w Xarelto 15mg qd (for Cr clearance ~45, if CrCl > 50 back to 20mg)  - avoiding BBs and CCBs i/s/o likely ADHF  - s/p Digoxin load 1mg total 2/13 Per chart from 2018 used to be on Digoxin but not currently.  On Xarelto 20mg qd at home.    - [id number] = 4   - c/w Xarelto 15mg qd (for Cr clearance ~45, if CrCl > 50 back to 20mg)  - s/p Digoxin load 1mg total 2/13  - BB as above  - possible rhythm control agent outpatient

## 2024-02-14 NOTE — PROGRESS NOTE ADULT - PROBLEM SELECTOR PLAN 6
Diet: regular, DASH/TLC  DVT ppx: home Xarelto  GI ppx: none    Code status: full code, as discussed with son at bedside on admission    Dispo: pending Diet: regular, DASH/TLC  DVT ppx: home Xarelto  GI ppx: none    Code status: full code, as discussed with son at bedside on admission    Dispo:   - PT rec rolling walker, home w home PT services

## 2024-02-14 NOTE — PROGRESS NOTE ADULT - PROBLEM SELECTOR PLAN 3
home regimen: Losartan-HCTZ 50-12.5mg qd, amlodipine ?mg qd    - appreciate cards consult  - hold amlodipine, can add PO hydral if needed  - c/w home losartan for afterload reduction, holding HCTZ home regimen: Losartan-HCTZ 50-12.5mg qd, amlodipine ?mg qd    - appreciate cards consult  - hold amlodipine, can add PO hydral if needed  - switched to entresto as above

## 2024-02-14 NOTE — PROGRESS NOTE ADULT - ATTENDING COMMENTS
The patient was seen and examined with the Cardiology Consultation Teaching Service.     No overnight events    No chest pain  No dyspnea, orthopnea or PND  No palpitations or dizziness     PMH/PSH:  Atrial fibrillation on rivaroxaban  Hypertension  Dyslipidemia  Goiter    Comfortable-appearing woman in no acute distress  Alert and oriented  Afebrile  Vital signs stable  I/O net positive 0.4L  JVP is not elevated  Bilateral basilar rales  Irregularly irregular  Normal heart sounds  Extremities are warm and perfused  Resolved peripheral edema    Normal blood counts  GFR 59    hs-troponin 29  pro-BNP 16K    Echocardiography demonstrates severe globally reduced LV function, LVEF 20-25%, enlarged RV with reduced systolic function, trace MR, and mild-moderate AI    Impression and Recommendations:   83-year-old woman with atrial fibrillation, hypertension and dyslipidemia who presented with about ten days of worsening fatigue, dyspnea, and anorexia found to have significant hypoxia, likely pulmonary congestion and atrial fibrillation with rapid ventricular response concerning for acute decompensated heart failure.    Echocardiography reveals severe LV systolic dysfunction, with mild-moderate AI. The patient appears to be clinically improving, so I suspect her I/O recording may be inaccurate.     Continue intravenous diuresis for a goal net negative 1-2L daily. We will plan to transition the patient to sacubitril-valsartan from losartan. Additionally I would start metoprolol succinate.    We will need to discuss the possibility of coronary angiography with the patient and her son. Given that her cardiomyopathy was discovered in the context of atrial fibrillation with rapid ventricular response, it might be reasonable to defer angiography for continued medical management and rate control.     Additionally, given her new LV systolic dysfunction, it would also be reasonable to consider a rhythm control strategy for this patient, though this could also be determined in the outpatient setting.   Rivaroxaban can be continued for anticoagulation for the time being. If the patient's anticoagulation needs to be interrupted, no unfractionated heparin bridging is absolutely necessary based on the BRIDGE Trial, though with a [id number]-PNFn of 5, it would be reasonable to do so given the underrepresentation of patients with that risk score in the study.     Tony Swenson MD FAC FACP  Cardiology  [phone] The patient was seen and examined with the Cardiology Consultation Teaching Service.     No overnight events    No chest pain  No dyspnea, orthopnea or PND  No palpitations or dizziness     PMH/PSH:  Atrial fibrillation on rivaroxaban  Hypertension  Dyslipidemia  Goiter    Comfortable-appearing woman in no acute distress  Alert and oriented  Afebrile  Vital signs stable  I/O net positive 0.4L  JVP is not elevated  Bilateral basilar rales  Irregularly irregular  Normal heart sounds  Extremities are warm and perfused  Resolved peripheral edema    Normal blood counts  GFR 59    hs-troponin 29  pro-BNP 16K    Echocardiography demonstrates severe globally reduced LV function, LVEF 20-25%, enlarged RV with reduced systolic function, trace MR, and mild-moderate AI    Impression and Recommendations:   83-year-old woman with atrial fibrillation, hypertension and dyslipidemia who presented with about ten days of worsening fatigue, dyspnea, and anorexia found to have significant hypoxia, likely pulmonary congestion and atrial fibrillation with rapid ventricular response concerning for acute decompensated heart failure.    Echocardiography reveals severe LV systolic dysfunction, with mild-moderate AI. The patient appears to be clinically improving, so I suspect her I/O recording may be inaccurate.     Continue intravenous diuresis for a goal net negative 1-2L daily. We will plan to transition the patient to sacubitril-valsartan from losartan. Additionally I would start metoprolol succinate.    Given that her cardiomyopathy was discovered in the context of atrial fibrillation with rapid ventricular response, it might be reasonable to defer angiography for continued medical management and rate control. I spoke with the patient's son, and at this time, he is very reluctant to refer his mother for an invasive procedure, and would like to try to avoid this if possible. Therefore, I would plan to continue medical therapy and reassess her LV function as an outpatient.     Additionally, given her new LV systolic dysfunction, it would also be reasonable to consider a rhythm control strategy for this patient, though this could also be determined in the outpatient setting.     Rivaroxaban can be continued for anticoagulation for the time being. If the patient's anticoagulation needs to be interrupted, no unfractionated heparin bridging is absolutely necessary based on the BRIDGE Trial, though with a [id number]-BDUh of 5, it would be reasonable to do so given the underrepresentation of patients with that risk score in the study.     Tony Swenson MD [facility] FACP  Cardiology  [phone]

## 2024-02-14 NOTE — PHYSICAL THERAPY INITIAL EVALUATION ADULT - LEVEL OF INDEPENDENCE: GAIT, REHAB EVAL
Pt initially ambulated 5 feet holding onto therapist arm. Pt ambulated next 15 feet with rolling walker/contact guard

## 2024-02-14 NOTE — PHYSICAL THERAPY INITIAL EVALUATION ADULT - ADDITIONAL COMMENTS
used, please refer to Assessment Flowsheet for information.   Pt states she lives in a house with 3 steps to enter, her son helps her with the stairs. Pt lives on the main level. Prior to admission, pt was ambulating independently.   Post PT evaluation, pt left semi-supine, alarm on, call bell and remote within reach, all precautions maintained, NAD. RN aware.

## 2024-02-14 NOTE — PROGRESS NOTE ADULT - ASSESSMENT
84yo Azeri speaking F w PMHx HTN, HLD, A-fib on Xarelto, adenomatous nodular goiter presenting to the ED with fatigue, SOB, and poor appetite for the past ~ 10 days. Prior to the recent onset of symptoms, son states pt was in relatively good health, able to walk and move around without any problem. Pt has not visited her cardiologist in several years. Pt found to have large b/l plefs, was briefly on BIPAP in ED weaned back to NC.  Pt admitted for likely acute decompensated (new-onset) heart failure and a-fib w RVR. Per cardio Dr. Chris Le, prefer admission to hospitalist w house cards to follow.

## 2024-02-15 ENCOUNTER — TRANSCRIPTION ENCOUNTER (OUTPATIENT)
Age: 84
End: 2024-02-15

## 2024-02-15 LAB
ANION GAP SERPL CALC-SCNC: 13 MMOL/L — SIGNIFICANT CHANGE UP (ref 7–14)
BUN SERPL-MCNC: 19 MG/DL — SIGNIFICANT CHANGE UP (ref 7–23)
CALCIUM SERPL-MCNC: 8.6 MG/DL — SIGNIFICANT CHANGE UP (ref 8.4–10.5)
CHLORIDE SERPL-SCNC: 97 MMOL/L — LOW (ref 98–107)
CO2 SERPL-SCNC: 29 MMOL/L — SIGNIFICANT CHANGE UP (ref 22–31)
CREAT SERPL-MCNC: 0.94 MG/DL — SIGNIFICANT CHANGE UP (ref 0.5–1.3)
EGFR: 60 ML/MIN/1.73M2 — SIGNIFICANT CHANGE UP
GLUCOSE SERPL-MCNC: 70 MG/DL — SIGNIFICANT CHANGE UP (ref 70–99)
HCT VFR BLD CALC: 44 % — SIGNIFICANT CHANGE UP (ref 34.5–45)
HGB BLD-MCNC: 14.1 G/DL — SIGNIFICANT CHANGE UP (ref 11.5–15.5)
MAGNESIUM SERPL-MCNC: 2.2 MG/DL — SIGNIFICANT CHANGE UP (ref 1.6–2.6)
MCHC RBC-ENTMCNC: 25.5 PG — LOW (ref 27–34)
MCHC RBC-ENTMCNC: 32 GM/DL — SIGNIFICANT CHANGE UP (ref 32–36)
MCV RBC AUTO: 79.6 FL — LOW (ref 80–100)
NRBC # BLD: 0 /100 WBCS — SIGNIFICANT CHANGE UP (ref 0–0)
NRBC # FLD: 0 K/UL — SIGNIFICANT CHANGE UP (ref 0–0)
PHOSPHATE SERPL-MCNC: 3.8 MG/DL — SIGNIFICANT CHANGE UP (ref 2.5–4.5)
PLATELET # BLD AUTO: 209 K/UL — SIGNIFICANT CHANGE UP (ref 150–400)
POTASSIUM SERPL-MCNC: 3.9 MMOL/L — SIGNIFICANT CHANGE UP (ref 3.5–5.3)
POTASSIUM SERPL-SCNC: 3.9 MMOL/L — SIGNIFICANT CHANGE UP (ref 3.5–5.3)
RBC # BLD: 5.53 M/UL — HIGH (ref 3.8–5.2)
RBC # FLD: 15.7 % — HIGH (ref 10.3–14.5)
SODIUM SERPL-SCNC: 139 MMOL/L — SIGNIFICANT CHANGE UP (ref 135–145)
WBC # BLD: 9.89 K/UL — SIGNIFICANT CHANGE UP (ref 3.8–10.5)
WBC # FLD AUTO: 9.89 K/UL — SIGNIFICANT CHANGE UP (ref 3.8–10.5)

## 2024-02-15 PROCEDURE — 99232 SBSQ HOSP IP/OBS MODERATE 35: CPT | Mod: GC

## 2024-02-15 PROCEDURE — 99231 SBSQ HOSP IP/OBS SF/LOW 25: CPT

## 2024-02-15 RX ORDER — FUROSEMIDE 40 MG
40 TABLET ORAL DAILY
Refills: 0 | Status: DISCONTINUED | OUTPATIENT
Start: 2024-02-15 | End: 2024-02-15

## 2024-02-15 RX ORDER — SPIRONOLACTONE 25 MG/1
25 TABLET, FILM COATED ORAL DAILY
Refills: 0 | Status: DISCONTINUED | OUTPATIENT
Start: 2024-02-15 | End: 2024-02-16

## 2024-02-15 RX ORDER — SACUBITRIL AND VALSARTAN 24; 26 MG/1; MG/1
1 TABLET, FILM COATED ORAL
Refills: 0 | Status: DISCONTINUED | OUTPATIENT
Start: 2024-02-15 | End: 2024-02-16

## 2024-02-15 RX ORDER — LANOLIN ALCOHOL/MO/W.PET/CERES
3 CREAM (GRAM) TOPICAL AT BEDTIME
Refills: 0 | Status: DISCONTINUED | OUTPATIENT
Start: 2024-02-15 | End: 2024-02-16

## 2024-02-15 RX ORDER — POTASSIUM CHLORIDE 20 MEQ
40 PACKET (EA) ORAL ONCE
Refills: 0 | Status: COMPLETED | OUTPATIENT
Start: 2024-02-15 | End: 2024-02-15

## 2024-02-15 RX ORDER — FUROSEMIDE 40 MG
40 TABLET ORAL DAILY
Refills: 0 | Status: DISCONTINUED | OUTPATIENT
Start: 2024-02-16 | End: 2024-02-16

## 2024-02-15 RX ADMIN — SENNA PLUS 2 TABLET(S): 8.6 TABLET ORAL at 21:24

## 2024-02-15 RX ADMIN — ATORVASTATIN CALCIUM 40 MILLIGRAM(S): 80 TABLET, FILM COATED ORAL at 21:25

## 2024-02-15 RX ADMIN — Medication 40 MILLIGRAM(S): at 05:29

## 2024-02-15 RX ADMIN — SPIRONOLACTONE 25 MILLIGRAM(S): 25 TABLET, FILM COATED ORAL at 17:38

## 2024-02-15 RX ADMIN — RIVAROXABAN 15 MILLIGRAM(S): KIT at 16:47

## 2024-02-15 RX ADMIN — Medication 40 MILLIEQUIVALENT(S): at 10:00

## 2024-02-15 RX ADMIN — SACUBITRIL AND VALSARTAN 1 TABLET(S): 24; 26 TABLET, FILM COATED ORAL at 17:04

## 2024-02-15 RX ADMIN — Medication 3 MILLIGRAM(S): at 21:25

## 2024-02-15 RX ADMIN — CHLORHEXIDINE GLUCONATE 1 APPLICATION(S): 213 SOLUTION TOPICAL at 12:27

## 2024-02-15 RX ADMIN — Medication 25 MILLIGRAM(S): at 05:29

## 2024-02-15 RX ADMIN — SACUBITRIL AND VALSARTAN 1 TABLET(S): 24; 26 TABLET, FILM COATED ORAL at 05:29

## 2024-02-15 RX ADMIN — Medication 3 MILLIGRAM(S): at 00:18

## 2024-02-15 NOTE — DISCHARGE NOTE PROVIDER - CARE PROVIDER_API CALL
Alvaro Robison  Internal Medicine  [address] [place] [address]  Phone: [phone]  Fax: [phone]  Follow Up Time:

## 2024-02-15 NOTE — DISCHARGE NOTE PROVIDER - NSFOLLOWUPCLINICS_GEN_ALL_ED_FT
Cardiology at [facility]  Cardiology  [address] [place] [id number]  Phone: [phone]  Fax:     Cardiology at [facility]  Cardiology  [address] [place] [address]  Phone: [phone]  Fax:

## 2024-02-15 NOTE — PROGRESS NOTE ADULT - SUBJECTIVE AND OBJECTIVE BOX
PROGRESS NOTE:     Patient is a 83y old  Female who presents with a chief complaint of Acute decompensated heart failure (14 Feb 2024 08:38)      SUBJECTIVE / OVERNIGHT EVENTS:    Pain:  Bowel Movements:  Urination:  OOB:  PT:    REVIEW OF SYSTEMS:    CONSTITUTIONAL: No weakness, fevers or chills  EYES/ENT: No visual changes;  No vertigo or throat pain   NECK: No pain or stiffness  RESPIRATORY: No cough, wheezing, hemoptysis; No shortness of breath  CARDIOVASCULAR: No chest pain or palpitations  GASTROINTESTINAL: No abdominal or epigastric pain. No nausea, vomiting, or hematemesis; No diarrhea or constipation. No melena or hematochezia.  GENITOURINARY: No dysuria, frequency or hematuria  NEUROLOGICAL: No numbness or weakness  SKIN: No itching, rashes      MEDICATIONS  (STANDING):  atorvastatin 40 milliGRAM(s) Oral at bedtime  chlorhexidine 2% Cloths 1 Application(s) Topical daily  furosemide   Injectable 40 milliGRAM(s) IV Push every 12 hours  influenza  Vaccine (HIGH DOSE) 0.7 milliLiter(s) IntraMuscular once  melatonin 3 milliGRAM(s) Oral at bedtime  metoprolol succinate ER 25 milliGRAM(s) Oral daily  rivaroxaban 15 milliGRAM(s) Oral with dinner  sacubitril 24 mG/valsartan 26 mG 1 Tablet(s) Oral two times a day  senna 2 Tablet(s) Oral at bedtime    MEDICATIONS  (PRN):  acetaminophen     Tablet .. 650 milliGRAM(s) Oral every 6 hours PRN Temp greater or equal to 38C (100.4F), Mild Pain (1 - 3)      CAPILLARY BLOOD GLUCOSE        I&O's Summary    14 Feb 2024 07:01  -  15 Feb 2024 07:00  --------------------------------------------------------  IN: 250 mL / OUT: 2100 mL / NET: -1850 mL        VITALS:   T(C): 37.4 (02-14-24 @ 23:40), Max: 37.4 (02-14-24 @ 23:40)  HR: 141 (02-14-24 @ 23:40) (65 - 141)  BP: 121/61 (02-14-24 @ 23:40) (103/65 - 121/61)  RR: 17 (02-14-24 @ 23:40) (17 - 18)  SpO2: 95% (02-14-24 @ 23:40) (95% - 98%)    GENERAL: NAD, lying in bed comfortably  HEAD:  Atraumatic, normocephalic  EYES: EOMI, PERRLA, conjunctiva and sclera clear  ENT: Moist mucous membranes  NECK: Supple, no JVD  HEART: Regular rate and rhythm, no murmurs, rubs, or gallops  LUNGS: Unlabored respirations.  Clear to auscultation bilaterally, no crackles, wheezing, or rhonchi  ABDOMEN: Soft, nontender, nondistended, +BS  EXTREMITIES: 2+ peripheral pulses bilaterally. No clubbing, cyanosis, or edema  NERVOUS SYSTEM:  A&Ox3, no focal deficits   SKIN: No rashes or lesions    LABS:                        13.3   7.97  )-----------( 169      ( 14 Feb 2024 06:31 )             42.0     02-14    138  |  98  |  19  ----------------------------<  103<H>  3.9   |  28  |  1.00    Ca    8.4      14 Feb 2024 18:53  Phos  3.3     02-14  Mg     2.30     02-14      PT/INR - ( 13 Feb 2024 18:03 )   PT: 17.5 sec;   INR: 1.57 ratio         PTT - ( 14 Feb 2024 06:31 )  PTT:30.7 sec      Urinalysis Basic - ( 14 Feb 2024 18:53 )    Color: x / Appearance: x / SG: x / pH: x  Gluc: 103 mg/dL / Ketone: x  / Bili: x / Urobili: x   Blood: x / Protein: x / Nitrite: x   Leuk Esterase: x / RBC: x / WBC x   Sq Epi: x / Non Sq Epi: x / Bacteria: x          RADIOLOGY & ADDITIONAL TESTS:  Results Reviewed:   Imaging Personally Reviewed:  Electrocardiogram Personally Reviewed:    COORDINATION OF CARE:  Care Discussed with Consultants/Other Providers [Y/N]:  Prior or Outpatient Records Reviewed [Y/N]:   PROGRESS NOTE:     Patient is a 83y old  Female who presents with a chief complaint of Acute decompensated heart failure (14 Feb 2024 08:38)      SUBJECTIVE / OVERNIGHT EVENTS:  Tele events overnight: sporadic tachy to 160s for a few seconds, also sporadic beats of V-tach max 5 beats non-sustained  Patient seen and examined at bedside this AM. Denies any chest pain, SOB, leg swelling, other ROS.    REVIEW OF SYSTEMS:    General: denies fever, chills, sweats. poor appetite, fatigue  HEENT: denies headache, dizziness, changes in vision/blurriness  Cardio: denies chest pain, palpitations, orthopnea, PND, + mild leg swelling  Resp: + prior SOB, denies cough, rhinorrhea, hemoptysis, wheezing  GI: denies abdominal pain, nausea, vomiting, diarrhea, constipation, dysphagia, hematemesis, hematochezia, melena  : denies dysuria, frequency, hematuria, flank pain  Neuro: denies headache, dizziness, change in level of consciousness, numbness, paraesthesia, seizures      MEDICATIONS  (STANDING):  atorvastatin 40 milliGRAM(s) Oral at bedtime  chlorhexidine 2% Cloths 1 Application(s) Topical daily  furosemide   Injectable 40 milliGRAM(s) IV Push every 12 hours  influenza  Vaccine (HIGH DOSE) 0.7 milliLiter(s) IntraMuscular once  melatonin 3 milliGRAM(s) Oral at bedtime  metoprolol succinate ER 25 milliGRAM(s) Oral daily  rivaroxaban 15 milliGRAM(s) Oral with dinner  sacubitril 24 mG/valsartan 26 mG 1 Tablet(s) Oral two times a day  senna 2 Tablet(s) Oral at bedtime    MEDICATIONS  (PRN):  acetaminophen     Tablet .. 650 milliGRAM(s) Oral every 6 hours PRN Temp greater or equal to 38C (100.4F), Mild Pain (1 - 3)      CAPILLARY BLOOD GLUCOSE        I&O's Summary    14 Feb 2024 07:01  -  15 Feb 2024 07:00  --------------------------------------------------------  IN: 250 mL / OUT: 2100 mL / NET: -1850 mL        VITALS:   T(C): 37.4 (02-14-24 @ 23:40), Max: 37.4 (02-14-24 @ 23:40)  HR: 141 (02-14-24 @ 23:40) (65 - 141)  BP: 121/61 (02-14-24 @ 23:40) (103/65 - 121/61)  RR: 17 (02-14-24 @ 23:40) (17 - 18)  SpO2: 95% (02-14-24 @ 23:40) (95% - 98%)    GENERAL: NAD, lying in bed comfortably  HEENT: NC/AT. Sclera clear, no conjunctival pallor. EOMI, PERRLA. No nasal discharge. Throat: no erythema, no exudates on tonsils, uvula midline.  Cardiac: irregular rhythm, mildly tachycardic, +S1/S2. No murmurs, rubs, or gallops.   Chest: Crackles (minimal) at lung bases b/l. No wheezing.  Abdomen: Soft, nontender, mildly distended. No guarding, no rebound tenderness.  Neuro: Alert and oriented x3. Motor strength and sensation intact.    Extremities: No rashes, no bruising. No joint swelling. Minimal edema in b/l LE. Good peripheral pulses bilaterally.      LABS:                        13.3   7.97  )-----------( 169      ( 14 Feb 2024 06:31 )             42.0     02-14    138  |  98  |  19  ----------------------------<  103<H>  3.9   |  28  |  1.00    Ca    8.4      14 Feb 2024 18:53  Phos  3.3     02-14  Mg     2.30     02-14      PT/INR - ( 13 Feb 2024 18:03 )   PT: 17.5 sec;   INR: 1.57 ratio         PTT - ( 14 Feb 2024 06:31 )  PTT:30.7 sec      Urinalysis Basic - ( 14 Feb 2024 18:53 )    Color: x / Appearance: x / SG: x / pH: x  Gluc: 103 mg/dL / Ketone: x  / Bili: x / Urobili: x   Blood: x / Protein: x / Nitrite: x   Leuk Esterase: x / RBC: x / WBC x   Sq Epi: x / Non Sq Epi: x / Bacteria: x          RADIOLOGY & ADDITIONAL TESTS:  Results Reviewed:   Imaging Personally Reviewed:  Electrocardiogram Personally Reviewed:    COORDINATION OF CARE:  Care Discussed with Consultants/Other Providers [Y/N]:  Prior or Outpatient Records Reviewed [Y/N]:

## 2024-02-15 NOTE — PROGRESS NOTE ADULT - PROBLEM SELECTOR PLAN 2
Per chart from 2018 used to be on Digoxin but not currently.  On Xarelto 20mg qd at home.    - [id number] = 4   - c/w Xarelto 15mg qd (for Cr clearance ~45, if CrCl > 50 back to 20mg)  - s/p Digoxin load 1mg total 2/13  - BB as above  - possible rhythm control agent outpatient

## 2024-02-15 NOTE — DISCHARGE NOTE PROVIDER - NSDCFUADDAPPT_GEN_ALL_CORE_FT
Please follow-up with:    - your primary care doctor    - your usual cardiologist or a new cardiologist in 2-3 weeks (information for our cardiology clinics is included above)

## 2024-02-15 NOTE — PROGRESS NOTE ADULT - ATTENDING COMMENTS
83-year-old woman with atrial fibrillation, hypertension and dyslipidemia who presented with about ten days of worsening fatigue, dyspnea, and anorexia found to have significant hypoxia, likely pulmonary congestion and atrial fibrillation with rapid ventricular response concerning for acute decompensated heart failure.    #Hypoxic resp failure iso ADHF  Clinically improved   TTE showed severe LV hypokinesis globalled, EF 20-25 %  - Lasix 40 IV bid---> transitioned to Lasix 40mg daily, trend I/O, daily weight , BMP daily now  - Started entresto and up-titrated, beta blocker, on Statin  - Cards recs appreciated, will advise on DC planning   - Defer invasive measures to outpatient given global hypokinesis and questionable utility at this time  - Cards follow up outpatient, likely repeat TTE outpatient post GDMT therapy     #Afib RVR likely iso ADHF, or Afib RVR causing low CO  - Cards recs apprreciated  - DOAC  - s/p dig load now rate controlled     #Lactate  -, likely due to ADHF, RESOLVED    #Dispo  - home PT     Rest as above

## 2024-02-15 NOTE — PROGRESS NOTE ADULT - ASSESSMENT
84yo Yi speaking F w PMHx HTN, HLD, A-fib on Xarelto, adenomatous nodular goiter presenting to the ED with fatigue, SOB, and poor appetite for the past ~ 10 days. Prior to the recent onset of symptoms, son states pt was in relatively good health, able to walk and move around without any problem. Pt has not visited her cardiologist in several years. Pt found to have large b/l plefs, was briefly on BIPAP in ED weaned back to NC.  Pt admitted for likely acute decompensated (new-onset) heart failure and a-fib w RVR. Per cardio Dr. Chris Le, prefer admission to hospitalist w house cards to follow.

## 2024-02-15 NOTE — PROGRESS NOTE ADULT - PROBLEM SELECTOR PLAN 5
Pt s/p FNA in 02/2018 for thyroid nodules, path came back benign, adenomatous nodular goiter.  Currently not grossly enlarged on physical exam.  CTA chest 2/12: Enlarged, heterogeneous thyroid gland with substernal extension bilaterally with associated narrowing of the trachea at the level of the thoracic inlet. Multiple subcentimeter in short axis mediastinal nodes.    - TSH wnl. T3 wnl. fT4 slightly elevated 2.2.  - outpatient f/u

## 2024-02-15 NOTE — CHART NOTE - NSCHARTNOTEFT_GEN_A_CORE
Rolling Walker:  Patient requires rolling walker due to ____ (Specify Diagnosis**) _____ for safe ambulation at discharge.  **only causal diagnosis (not symptom) will qualify for rolling walker per CMS guidelines Rolling Walker:     Patient will require a rolling walker at home due to their diagnosis of I150.9 to help complete their MRADLS.

## 2024-02-15 NOTE — PROGRESS NOTE ADULT - PROBLEM SELECTOR PLAN 1
No known Hx of HF.  Admission: ProBNP 15.9k. Trop neg. VBG showing pH 7.35, pCO2 48, lactate 3.8 -> 2.9.  CXR showed small b/l pleural effusions/adjacent interstitial infiltrate/atelectasis. ED POCUS TTE showed severely decreased cardiac contractility, plethoric IVC, large b/l pleural effusions favoring CHF vs less likely PNA.  CTA chest showed no PE, mod size b/l pleural effusions (R > L) w R middle lobe.    - appreciate cards eval  - TTE 2/13 showing EF 20-25%, global LV hypokinesis, mild-mod AR  - strict I&Os, daily weights, 1.5L/day fluid restriction  - wean O2 as tolerated  - c/w lasix 40mg IV bid, goal net neg 1-2 L/day  - start Entresto 24/26mg bid  - start Toprol 25mg qd  - deferring cath, reassess LV function outpatient No known Hx of HF.  Admission: ProBNP 15.9k. Trop neg. VBG showing pH 7.35, pCO2 48, lactate 3.8 -> 2.9.  CXR showed small b/l pleural effusions/adjacent interstitial infiltrate/atelectasis. ED POCUS TTE showed severely decreased cardiac contractility, plethoric IVC, large b/l pleural effusions favoring CHF vs less likely PNA.  CTA chest showed no PE, mod size b/l pleural effusions (R > L) w R middle lobe.    - appreciate cards eval  - TTE 2/13 showing EF 20-25%, global LV hypokinesis, mild-mod AR  - strict I&Os, daily weights, 1.5L/day fluid restriction  - wean O2 as tolerated  - transition to lasix 40mg PO qd  - increase Entresto to 49/51mg bid  - c/w Toprol 25mg qd  - start aldactone 25mg qd  - deferring cath, reassess LV function outpatient

## 2024-02-15 NOTE — DISCHARGE NOTE PROVIDER - CARE PROVIDERS DIRECT ADDRESSES
I called and spoke with Ed. He sent a note today saying that he will be switching physicians to a doctor who is connected with [facility].  He will let us know who that is so that we can assist in transfer of records. [email or web address]

## 2024-02-15 NOTE — PROGRESS NOTE ADULT - PROBLEM SELECTOR PLAN 6
Diet: regular, DASH/TLC  DVT ppx: home Xarelto  GI ppx: none    Code status: full code, as discussed with son at bedside on admission    Dispo:   - PT rec rolling walker, home w home PT services

## 2024-02-15 NOTE — PROVIDER CONTACT NOTE (OTHER) - DATE AND TIME:
13-Feb-2024 12:00
14-Feb-2024 11:39
13-Feb-2024 07:10
14-Feb-2024 16:10
13-Feb-2024 14:30
13-Feb-2024 17:10
15-Feb-2024 08:34

## 2024-02-15 NOTE — DISCHARGE NOTE PROVIDER - NSDCMRMEDTOKEN_GEN_ALL_CORE_FT
amLODIPine 5 mg oral tablet: 1 tab(s) orally once a day  atorvastatin 40 mg oral tablet: 1 tab(s) orally once a day  losartan-hydrochlorothiazide 50 mg-12.5 mg oral tablet: 1 tab(s) orally once a day  Xarelto 20 mg oral tablet: 1 tab(s) orally once a day (at bedtime)   Aldactone 25 mg oral tablet: 1 tab(s) orally once a day  atorvastatin 40 mg oral tablet: 1 tab(s) orally once a day  Lasix 40 mg oral tablet: 1 tab(s) orally once a day  rivaroxaban 15 mg oral tablet: 1 tab(s) orally once a day (before a meal)  sacubitril-valsartan 49 mg-51 mg oral tablet: 1 tab(s) orally 2 times a day  Toprol-XL 50 mg oral tablet, extended release: 1 tab(s) orally once a day

## 2024-02-15 NOTE — PROVIDER CONTACT NOTE (OTHER) - ACTION/TREATMENT ORDERED:
provider aware
provider aware. tylenol given for pain
no intervention
provider notified and at bedside. will administer PO metoprolol as ordered. continue to monitor patient on telemetry.
as per MD/cardiology, give scheduled dose
melatonin given
Provider to speak to cardiology, continue to monitor

## 2024-02-15 NOTE — PROVIDER CONTACT NOTE (OTHER) - ASSESSMENT
VS WDL
VSS. afib on monitor. denies nausea/vomitting/visual changes/heart palpatations/chest pain
vitals stable, no distress, pt. safety maintained
Patient asleep, no acute distress noted.
patient is sleeping in bed asymptomatic A&Ox4. no acute distress noted. vitals stable in flowsheet
no-distress, pt. on side of the bed, /61, O2 100, RR 19, Temp 99.3 rectal

## 2024-02-15 NOTE — PROVIDER CONTACT NOTE (OTHER) - BACKGROUND
Atrial Fibrillation, Nodular goiter, hypertension, hyperlipidemia
atrial fibrillation, Nodular goiter, Hypertension, hyperlipidemia
84 y/o F admitted with new onset heart failure and afib RVR
Patient admitted with Afib, digoxin IV push administered overnight

## 2024-02-15 NOTE — PROVIDER CONTACT NOTE (OTHER) - REASON
leg pain
digoxin order
pt. going to 160s HR non-sustaining
Patient with 8 beats of Vtach on monitor
patient having multiple runs of vtach
pt. HR to the 150s, 4 beats of v-tach
rapid afib/pvcs

## 2024-02-15 NOTE — PROGRESS NOTE ADULT - ASSESSMENT
Impression and Recommendations:   83-year-old woman with atrial fibrillation, hypertension and dyslipidemia who presented with about ten days of worsening fatigue, dyspnea, and anorexia found to have significant hypoxia, likely pulmonary congestion and atrial fibrillation with rapid ventricular response concerning for acute decompensated heart failure.    Echocardiography reveals severe LV systolic dysfunction, with mild-moderate AI. The patient appears to be relatively euvolemic at this point. I would plan to transition her to oral maintenance diuretics at this time, starting with furosemide PO 40mg daily. Sacubitril-valsartan may also aid in maintaining euvolemia.     Continue sacubitril-valsartan and metoprolol succinate. I would plan on starting spironolactone today.     Given that her cardiomyopathy was discovered in the context of atrial fibrillation with rapid ventricular response, as well as the patient's family preference, we will plan for continued medical therapy without plans for invasive procedures. If the patient develops new symptoms or her LV function does not improve, this can be revisited as an outpatient.     Likewise, given her new LV systolic dysfunction, it would also be reasonable to consider a rhythm control strategy for this patient, though this could also be determined in the outpatient setting.     Rivaroxaban can be continued for anticoagulation.  The patient's change in mental status was reported to the primary team.     Tony Swenson MD FAC FACP  Cardiology  [phone]

## 2024-02-15 NOTE — DISCHARGE NOTE PROVIDER - NSDCCPCAREPLAN_GEN_ALL_CORE_FT
PRINCIPAL DISCHARGE DIAGNOSIS  Diagnosis: Acute decompensated heart failure  Assessment and Plan of Treatment: You came into the hospital due to shortness of breath and fatigue most likely due to symptoms of heart failure. Your imaging tests showed a good amoutn of fluid in your lungs. You received an echocardiogram which showed that your heart was not pumping as well, and you were started on medication to treat heart failure as well as diuretic medication to get fluid out of your body. Please continue to take the following medications and follow-up with your cardiologist.  - Entresto 49/51mg twice a day  - Aldactone 25mg once a day  - Toprol 50mg once a day  - Lasix 40mg once a day      SECONDARY DISCHARGE DIAGNOSES  Diagnosis: Fluid overload  Assessment and Plan of Treatment:     Diagnosis: Atrial fibrillation with RVR  Assessment and Plan of Treatment:      PRINCIPAL DISCHARGE DIAGNOSIS  Diagnosis: Acute decompensated heart failure  Assessment and Plan of Treatment: You came into the hospital due to shortness of breath and fatigue most likely due to symptoms of heart failure. Your imaging tests showed a good amoutn of fluid in your lungs. You received an echocardiogram which showed that your heart was not pumping as well, and you were started on medication to treat heart failure as well as diuretic medication to get fluid out of your body. Please continue to take the following medications and follow-up with your cardiologist.  - Entresto 49/51mg twice a day  - Aldactone 25mg once a day  - Toprol 50mg once a day  - Lasix 40mg once a day      SECONDARY DISCHARGE DIAGNOSES  Diagnosis: Atrial fibrillation with RVR  Assessment and Plan of Treatment: Your heart rate was quite high on admission, likely due to a rapid heart response from your chronic atrial fibrillation conidition. You were loaded with a few doses of a medication called digoxin, and your heart rate improved on that. You were also started on a medication called Toprol as above that helps control heart rate. Please follow-up with your cardiologist to continue management and possibly add a rhythm control medication as discussed during this admission.

## 2024-02-15 NOTE — PROGRESS NOTE ADULT - SUBJECTIVE AND OBJECTIVE BOX
[place] telephone  [phone]    Episode of atrial fibrillation with rapid ventricular rate overnight    No chest pain  No dyspnea, orthopnea or PND  No palpitations or dizziness     PMH/PSH:  Atrial fibrillation on rivaroxaban  Hypertension  Dyslipidemia  Goiter    Comfortable-appearing woman in no acute distress  Alert but newly disoriented; knows her name and her son's name  Could not identify that she was in a hospital  Afebrile  Vital signs stable  I/O net negative 1.2L  JVP is not elevated  Clear lungs  Irregularly irregular  Normal heart sounds  Extremities are warm and perfused  Resolved peripheral edema  No gross neurological deficits noted    Microcytosis without anemia  GFR 60    hs-troponin 29  pro-BNP 16K    Echocardiography demonstrates severe globally reduced LV function, LVEF 20-25%, enlarged RV with reduced systolic function, trace MR, and mild-moderate AI

## 2024-02-15 NOTE — PROGRESS NOTE ADULT - PROBLEM SELECTOR PLAN 3
home regimen: Losartan-HCTZ 50-12.5mg qd, amlodipine ?mg qd    - appreciate cards consult  - hold amlodipine, can add PO hydral if needed  - switched to entresto as above

## 2024-02-16 ENCOUNTER — TRANSCRIPTION ENCOUNTER (OUTPATIENT)
Age: 84
End: 2024-02-16

## 2024-02-16 VITALS
RESPIRATION RATE: 18 BRPM | DIASTOLIC BLOOD PRESSURE: 69 MMHG | SYSTOLIC BLOOD PRESSURE: 108 MMHG | HEART RATE: 107 BPM | OXYGEN SATURATION: 97 % | TEMPERATURE: 98 F

## 2024-02-16 LAB
ANION GAP SERPL CALC-SCNC: 16 MMOL/L — HIGH (ref 7–14)
BUN SERPL-MCNC: 19 MG/DL — SIGNIFICANT CHANGE UP (ref 7–23)
CALCIUM SERPL-MCNC: 8.8 MG/DL — SIGNIFICANT CHANGE UP (ref 8.4–10.5)
CHLORIDE SERPL-SCNC: 97 MMOL/L — LOW (ref 98–107)
CO2 SERPL-SCNC: 25 MMOL/L — SIGNIFICANT CHANGE UP (ref 22–31)
CREAT SERPL-MCNC: 0.95 MG/DL — SIGNIFICANT CHANGE UP (ref 0.5–1.3)
EGFR: 59 ML/MIN/1.73M2 — LOW
GLUCOSE SERPL-MCNC: 83 MG/DL — SIGNIFICANT CHANGE UP (ref 70–99)
HCT VFR BLD CALC: 45.8 % — HIGH (ref 34.5–45)
HGB BLD-MCNC: 14.5 G/DL — SIGNIFICANT CHANGE UP (ref 11.5–15.5)
MAGNESIUM SERPL-MCNC: 2.1 MG/DL — SIGNIFICANT CHANGE UP (ref 1.6–2.6)
MCHC RBC-ENTMCNC: 25.3 PG — LOW (ref 27–34)
MCHC RBC-ENTMCNC: 31.7 GM/DL — LOW (ref 32–36)
MCV RBC AUTO: 79.9 FL — LOW (ref 80–100)
NRBC # BLD: 0 /100 WBCS — SIGNIFICANT CHANGE UP (ref 0–0)
NRBC # FLD: 0 K/UL — SIGNIFICANT CHANGE UP (ref 0–0)
PHOSPHATE SERPL-MCNC: 3.6 MG/DL — SIGNIFICANT CHANGE UP (ref 2.5–4.5)
PLATELET # BLD AUTO: 255 K/UL — SIGNIFICANT CHANGE UP (ref 150–400)
POTASSIUM SERPL-MCNC: 4.1 MMOL/L — SIGNIFICANT CHANGE UP (ref 3.5–5.3)
POTASSIUM SERPL-SCNC: 4.1 MMOL/L — SIGNIFICANT CHANGE UP (ref 3.5–5.3)
RBC # BLD: 5.73 M/UL — HIGH (ref 3.8–5.2)
RBC # FLD: 16.3 % — HIGH (ref 10.3–14.5)
SODIUM SERPL-SCNC: 138 MMOL/L — SIGNIFICANT CHANGE UP (ref 135–145)
WBC # BLD: 10.91 K/UL — HIGH (ref 3.8–10.5)
WBC # FLD AUTO: 10.91 K/UL — HIGH (ref 3.8–10.5)

## 2024-02-16 PROCEDURE — 99232 SBSQ HOSP IP/OBS MODERATE 35: CPT

## 2024-02-16 PROCEDURE — 99231 SBSQ HOSP IP/OBS SF/LOW 25: CPT

## 2024-02-16 RX ORDER — SPIRONOLACTONE 25 MG/1
1 TABLET, FILM COATED ORAL
Qty: 30 | Refills: 0
Start: 2024-02-16 | End: 2024-03-16

## 2024-02-16 RX ORDER — RIVAROXABAN 15 MG-20MG
1 KIT ORAL
Qty: 30 | Refills: 0
Start: 2024-02-16 | End: 2024-03-16

## 2024-02-16 RX ORDER — RIVAROXABAN 15 MG-20MG
1 KIT ORAL
Refills: 0 | DISCHARGE

## 2024-02-16 RX ORDER — METOPROLOL TARTRATE 50 MG
1 TABLET ORAL
Qty: 30 | Refills: 0
Start: 2024-02-16 | End: 2024-03-16

## 2024-02-16 RX ORDER — METOPROLOL TARTRATE 50 MG
50 TABLET ORAL DAILY
Refills: 0 | Status: DISCONTINUED | OUTPATIENT
Start: 2024-02-16 | End: 2024-02-16

## 2024-02-16 RX ORDER — POTASSIUM CHLORIDE 20 MEQ
40 PACKET (EA) ORAL ONCE
Refills: 0 | Status: DISCONTINUED | OUTPATIENT
Start: 2024-02-16 | End: 2024-02-16

## 2024-02-16 RX ORDER — SACUBITRIL AND VALSARTAN 24; 26 MG/1; MG/1
1 TABLET, FILM COATED ORAL
Qty: 60 | Refills: 0
Start: 2024-02-16 | End: 2024-03-16

## 2024-02-16 RX ORDER — AMLODIPINE BESYLATE 2.5 MG/1
1 TABLET ORAL
Refills: 0 | DISCHARGE

## 2024-02-16 RX ORDER — FUROSEMIDE 40 MG
1 TABLET ORAL
Qty: 30 | Refills: 0
Start: 2024-02-16 | End: 2024-03-16

## 2024-02-16 RX ORDER — LOSARTAN/HYDROCHLOROTHIAZIDE 100MG-25MG
1 TABLET ORAL
Refills: 0 | DISCHARGE

## 2024-02-16 RX ADMIN — SPIRONOLACTONE 25 MILLIGRAM(S): 25 TABLET, FILM COATED ORAL at 05:33

## 2024-02-16 RX ADMIN — SACUBITRIL AND VALSARTAN 1 TABLET(S): 24; 26 TABLET, FILM COATED ORAL at 05:35

## 2024-02-16 RX ADMIN — Medication 40 MILLIGRAM(S): at 05:33

## 2024-02-16 RX ADMIN — Medication 25 MILLIGRAM(S): at 05:33

## 2024-02-16 NOTE — DISCHARGE NOTE NURSING/CASE MANAGEMENT/SOCIAL WORK - PATIENT PORTAL LINK FT
You can access the FollowMyHealth Patient Portal offered by [facility] by registering at the following website: [email or web address]. By joining [facility]’s FollowMyHealth portal, you will also be able to view your health information using other applications (apps) compatible with our system.

## 2024-02-16 NOTE — PROGRESS NOTE ADULT - ATTENDING COMMENTS
83-year-old woman with atrial fibrillation, hypertension and dyslipidemia who presented with about ten days of worsening fatigue, dyspnea, and anorexia found to have significant hypoxia, likely pulmonary congestion and atrial fibrillation with rapid ventricular response concerning for acute decompensated heart failure.    #Hypoxic resp failure iso ADHF  Clinically improved; euvolemic on exam   TTE showed severe LV hypokinesis globalled, EF 20-25 %  - Lasix 40 IV bid---> transitioned to Lasix 40mg PO daily  - On GDMT: entresto, beta blocker, Statin, and aldactone  - Tolerated well, electrolytes wnl   - Cards recs appreciated,   - Defer invasive measures to outpatient given global hypokinesis and questionable utility at this time  - Cards follow up outpatient, likely repeat TTE outpatient post GDMT therapy     #Afib RVR likely iso ADHF, or Afib RVR causing low CO  - Cards recs apprreciated  - DOAC  - s/p dig load now rate controlled   - May require rhythm control in outpatient setting     #Lactate  -, likely due to ADHF, RESOLVED    #Dispo  - home PT     Rest as above    Patient medically optimized for discharge. I personally spent 35 minutes on discharge planning

## 2024-02-16 NOTE — PHARMACOTHERAPY INTERVENTION NOTE - COMMENTS
Patient's son at bedside was counseled on heart failure medication indications, administration, and side effects. Also discussed fluid and salt restrictions, and maintaining a log of daily weights. In addition to this, discussed warning signs of volume overload (SOB, NICHOLS, orthopnea, edema, etc.) and when to seek medical attention. He was also counseled on xarelto including the purpose and administration. Discussed adverse effects in detail and when to seek medical attention (blood in stool, vomit, etc.). All questions and concerns were answered and addressed. Patient's son verbalized understanding and was provided with educational drug cards as well as the heart failure booklet.     Vanesa Sellers, Pharm.D.  Clinical Pharmacy Specialist  Spectra [phone]

## 2024-02-16 NOTE — DISCHARGE NOTE NURSING/CASE MANAGEMENT/SOCIAL WORK - NSDCPEFALRISK_GEN_ALL_CORE
For information on Fall & Injury Prevention, visit: [email or web address] OR  [email or web address] OR  https://www.cdc.gov/steadi/patient.html

## 2024-02-16 NOTE — PROGRESS NOTE ADULT - SUBJECTIVE AND OBJECTIVE BOX
PROGRESS NOTE:     Patient is a 83y old  Female who presents with a chief complaint of Acute decompensated heart failure (15 Feb 2024 14:00)      SUBJECTIVE / OVERNIGHT EVENTS:    Pain:  Bowel Movements:  Urination:  OOB:  PT:    REVIEW OF SYSTEMS:    CONSTITUTIONAL: No weakness, fevers or chills  EYES/ENT: No visual changes;  No vertigo or throat pain   NECK: No pain or stiffness  RESPIRATORY: No cough, wheezing, hemoptysis; No shortness of breath  CARDIOVASCULAR: No chest pain or palpitations  GASTROINTESTINAL: No abdominal or epigastric pain. No nausea, vomiting, or hematemesis; No diarrhea or constipation. No melena or hematochezia.  GENITOURINARY: No dysuria, frequency or hematuria  NEUROLOGICAL: No numbness or weakness  SKIN: No itching, rashes      MEDICATIONS  (STANDING):  atorvastatin 40 milliGRAM(s) Oral at bedtime  chlorhexidine 2% Cloths 1 Application(s) Topical daily  furosemide    Tablet 40 milliGRAM(s) Oral daily  influenza  Vaccine (HIGH DOSE) 0.7 milliLiter(s) IntraMuscular once  melatonin 3 milliGRAM(s) Oral at bedtime  metoprolol succinate ER 25 milliGRAM(s) Oral daily  rivaroxaban 15 milliGRAM(s) Oral with dinner  sacubitril 49 mG/valsartan 51 mG 1 Tablet(s) Oral two times a day  senna 2 Tablet(s) Oral at bedtime  spironolactone 25 milliGRAM(s) Oral daily    MEDICATIONS  (PRN):  acetaminophen     Tablet .. 650 milliGRAM(s) Oral every 6 hours PRN Temp greater or equal to 38C (100.4F), Mild Pain (1 - 3)      CAPILLARY BLOOD GLUCOSE        I&O's Summary    15 Feb 2024 07:01  -  16 Feb 2024 07:00  --------------------------------------------------------  IN: 200 mL / OUT: 400 mL / NET: -200 mL        VITALS:   T(C): 36.2 (02-16-24 @ 05:30), Max: 36.6 (02-15-24 @ 16:36)  HR: 109 (02-16-24 @ 05:30) (63 - 109)  BP: 108/67 (02-16-24 @ 05:30) (97/69 - 116/79)  RR: 17 (02-16-24 @ 05:30) (16 - 17)  SpO2: 96% (02-16-24 @ 05:30) (96% - 100%)    GENERAL: NAD, lying in bed comfortably  HEAD:  Atraumatic, normocephalic  EYES: EOMI, PERRLA, conjunctiva and sclera clear  ENT: Moist mucous membranes  NECK: Supple, no JVD  HEART: Regular rate and rhythm, no murmurs, rubs, or gallops  LUNGS: Unlabored respirations.  Clear to auscultation bilaterally, no crackles, wheezing, or rhonchi  ABDOMEN: Soft, nontender, nondistended, +BS  EXTREMITIES: 2+ peripheral pulses bilaterally. No clubbing, cyanosis, or edema  NERVOUS SYSTEM:  A&Ox3, no focal deficits   SKIN: No rashes or lesions    LABS:                        14.1   9.89  )-----------( 209      ( 15 Feb 2024 05:10 )             44.0     02-15    139  |  97<L>  |  19  ----------------------------<  70  3.9   |  29  |  0.94    Ca    8.6      15 Feb 2024 05:10  Phos  3.8     02-15  Mg     2.20     02-15            Urinalysis Basic - ( 15 Feb 2024 05:10 )    Color: x / Appearance: x / SG: x / pH: x  Gluc: 70 mg/dL / Ketone: x  / Bili: x / Urobili: x   Blood: x / Protein: x / Nitrite: x   Leuk Esterase: x / RBC: x / WBC x   Sq Epi: x / Non Sq Epi: x / Bacteria: x          RADIOLOGY & ADDITIONAL TESTS:  Results Reviewed:   Imaging Personally Reviewed:  Electrocardiogram Personally Reviewed:    COORDINATION OF CARE:  Care Discussed with Consultants/Other Providers [Y/N]:  Prior or Outpatient Records Reviewed [Y/N]:   PROGRESS NOTE:     Patient is a 83y old  Female who presents with a chief complaint of Acute decompensated heart failure (15 Feb 2024 14:00)      SUBJECTIVE / OVERNIGHT EVENTS:  Tele events: intermittent NSVTs max 4 beats. Tachy to 150s x 2.  Patient seen and examined at bedside this AM. Denies any chest pain, SOB, headache/dizziness.    REVIEW OF SYSTEMS:    GENERAL: NAD, lying in bed comfortably  HEENT: NC/AT. Sclera clear, no conjunctival pallor. EOMI, PERRLA. No nasal discharge. Throat: no erythema, no exudates on tonsils, uvula midline.  Cardiac: regular rhythm, mildly tachycardic, +S1/S2. No murmurs, rubs, or gallops.   Chest: Crackles (minimal) at lung bases b/l. No wheezing.  Abdomen: Soft, nontender, mildly distended. No guarding, no rebound tenderness.  Neuro: Alert and oriented x3. Motor strength and sensation intact.    Extremities: No rashes, no bruising. No joint swelling. Minimal edema in b/l LE. Good peripheral pulses bilaterally.    MEDICATIONS  (STANDING):  atorvastatin 40 milliGRAM(s) Oral at bedtime  chlorhexidine 2% Cloths 1 Application(s) Topical daily  furosemide    Tablet 40 milliGRAM(s) Oral daily  influenza  Vaccine (HIGH DOSE) 0.7 milliLiter(s) IntraMuscular once  melatonin 3 milliGRAM(s) Oral at bedtime  metoprolol succinate ER 25 milliGRAM(s) Oral daily  rivaroxaban 15 milliGRAM(s) Oral with dinner  sacubitril 49 mG/valsartan 51 mG 1 Tablet(s) Oral two times a day  senna 2 Tablet(s) Oral at bedtime  spironolactone 25 milliGRAM(s) Oral daily    MEDICATIONS  (PRN):  acetaminophen     Tablet .. 650 milliGRAM(s) Oral every 6 hours PRN Temp greater or equal to 38C (100.4F), Mild Pain (1 - 3)      CAPILLARY BLOOD GLUCOSE        I&O's Summary    15 Feb 2024 07:01  -  16 Feb 2024 07:00  --------------------------------------------------------  IN: 200 mL / OUT: 400 mL / NET: -200 mL        VITALS:   T(C): 36.2 (02-16-24 @ 05:30), Max: 36.6 (02-15-24 @ 16:36)  HR: 109 (02-16-24 @ 05:30) (63 - 109)  BP: 108/67 (02-16-24 @ 05:30) (97/69 - 116/79)  RR: 17 (02-16-24 @ 05:30) (16 - 17)  SpO2: 96% (02-16-24 @ 05:30) (96% - 100%)    GENERAL: NAD, lying in bed comfortably  HEAD:  Atraumatic, normocephalic  EYES: EOMI, PERRLA, conjunctiva and sclera clear  ENT: Moist mucous membranes  NECK: Supple, no JVD  HEART: Regular rate and rhythm, no murmurs, rubs, or gallops  LUNGS: Unlabored respirations.  Clear to auscultation bilaterally, no crackles, wheezing, or rhonchi  ABDOMEN: Soft, nontender, nondistended, +BS  EXTREMITIES: 2+ peripheral pulses bilaterally. No clubbing, cyanosis, or edema  NERVOUS SYSTEM:  A&Ox3, no focal deficits   SKIN: No rashes or lesions    LABS:                        14.1   9.89  )-----------( 209      ( 15 Feb 2024 05:10 )             44.0     02-15    139  |  97<L>  |  19  ----------------------------<  70  3.9   |  29  |  0.94    Ca    8.6      15 Feb 2024 05:10  Phos  3.8     02-15  Mg     2.20     02-15            Urinalysis Basic - ( 15 Feb 2024 05:10 )    Color: x / Appearance: x / SG: x / pH: x  Gluc: 70 mg/dL / Ketone: x  / Bili: x / Urobili: x   Blood: x / Protein: x / Nitrite: x   Leuk Esterase: x / RBC: x / WBC x   Sq Epi: x / Non Sq Epi: x / Bacteria: x

## 2024-02-16 NOTE — PROGRESS NOTE ADULT - PROBLEM SELECTOR PLAN 1
No known Hx of HF.  Admission: ProBNP 15.9k. Trop neg. VBG showing pH 7.35, pCO2 48, lactate 3.8 -> 2.9.  CXR showed small b/l pleural effusions/adjacent interstitial infiltrate/atelectasis. ED POCUS TTE showed severely decreased cardiac contractility, plethoric IVC, large b/l pleural effusions favoring CHF vs less likely PNA.  CTA chest showed no PE, mod size b/l pleural effusions (R > L) w R middle lobe.    - appreciate cards eval  - TTE 2/13 showing EF 20-25%, global LV hypokinesis, mild-mod AR  - strict I&Os, daily weights, 1.5L/day fluid restriction  - wean O2 as tolerated  - transition to lasix 40mg PO qd  - increase Entresto to 49/51mg bid  - c/w Toprol 25mg qd  - start aldactone 25mg qd  - deferring cath, reassess LV function outpatient No known Hx of HF.  Admission: ProBNP 15.9k. Trop neg. VBG showing pH 7.35, pCO2 48, lactate 3.8 -> 2.9.  CXR showed small b/l pleural effusions/adjacent interstitial infiltrate/atelectasis. ED POCUS TTE showed severely decreased cardiac contractility, plethoric IVC, large b/l pleural effusions favoring CHF vs less likely PNA.  CTA chest showed no PE, mod size b/l pleural effusions (R > L) w R middle lobe.    - appreciate cards eval  - TTE 2/13 showing EF 20-25%, global LV hypokinesis, mild-mod AR  - strict I&Os, daily weights, 1.5L/day fluid restriction  - wean O2 as tolerated  - transition to lasix 40mg PO qd  - increase Entresto to 49/51mg bid  - increase Toprol to 50mg qd  - start Aldactone 25mg qd  - deferring cath, reassess LV function outpatient

## 2024-02-16 NOTE — PROGRESS NOTE ADULT - SUBJECTIVE AND OBJECTIVE BOX
Subjective    No acute events overnight, Seen at bedside, with no acute complaints.     Current Meds:  acetaminophen     Tablet .. 650 milliGRAM(s) Oral every 6 hours PRN  atorvastatin 40 milliGRAM(s) Oral at bedtime  chlorhexidine 2% Cloths 1 Application(s) Topical daily  furosemide    Tablet 40 milliGRAM(s) Oral daily  influenza  Vaccine (HIGH DOSE) 0.7 milliLiter(s) IntraMuscular once  melatonin 3 milliGRAM(s) Oral at bedtime  metoprolol succinate ER 50 milliGRAM(s) Oral daily  rivaroxaban 15 milliGRAM(s) Oral with dinner  sacubitril 49 mG/valsartan 51 mG 1 Tablet(s) Oral two times a day  senna 2 Tablet(s) Oral at bedtime  spironolactone 25 milliGRAM(s) Oral daily      Vitals:  T(F): 97.1 (02-16), Max: 97.8 (02-15)  HR: 109 (02-16) (63 - 109)  BP: 108/67 (02-16) (97/69 - 116/79)  RR: 17 (02-16)  SpO2: 96% (02-16)  I&O's Summary    15 Feb 2024 07:01  -  16 Feb 2024 07:00  --------------------------------------------------------  IN: 350 mL / OUT: 800 mL / NET: -450 mL        Physical Exam:  General: No acute distress; well appearing  Cardiovascular: Irregularly irregular, S1, S2, no murmurs, rubs, or gallops; no edema;  Respiratory: Clear to auscultation bilaterally, speaking full sentences  Gastrointestinal: soft, non-tender, non-distended with normal bowel sounds  Extremities: 2+ pulses, no clubbing; no joint deformity, or edema                          14.5   10.91 )-----------( 255      ( 16 Feb 2024 06:00 )             45.8     02-16    138  |  97<L>  |  19  ----------------------------<  83  4.1   |  25  |  0.95    Ca    8.8      16 Feb 2024 06:00  Phos  3.6     02-16  Mg     2.10     02-16    CARDIAC MARKERS ( 12 Feb 2024 11:48 )  29 ng/L / x     / x     / x     / x     / x          CONCLUSIONS:      1. The left ventricular cavity is normal. Left ventricular wall thickness is normal. Left ventricular systolic function is severely decreased with an ejection fraction visually estimated at 20 to 25%. There is global left ventricular hypokinesis. There is no evidence of a left ventricular thrombus.   2. Enlarged right ventricular cavity size andreduced systolic function.   3. Structurally normal mitral valve with normal leaflet excursion. There is calcification of the mitral valve annulus. There is trace mitral regurgitation.   4. The aortic valve appears trileaflet with normal systolic excursion. There is calcification of the aortic valve leaflets. There is mild to moderate aortic regurgitation.   5. No prior echocardiogram is available for comparison.    ________________________________________________________________________________________  FINDINGS:     Left Ventricle:  The left ventricular cavity is normal. Left ventricular wall thickness is normal. Left ventricular systolic function is severely decreased with an ejection fraction visually estimated at 20 to 25%. There is global left ventricular hypokinesis. There is no evidence of a left ventricular thrombus.     Right Ventricle:  The right ventricular cavity is enlarged in size and reduced systolic function.     Left Atrium:  The left atrium is normal with an indexed volume of 30.99 ml/m².     Right Atrium:  The right atrium is normal in size with an indexed volume of 14.81 ml/m² and an indexed area of 6.84 cm²/m².     Aortic Valve:  The aortic valve appears trileaflet with normal systolic excursion. There is calcification of the aortic valve leaflets. There is mild to moderate aortic regurgitation.     Mitral Valve:  Structurally normal mitral valve with normal leaflet excursion. There is calcification of the mitral valve annulus. There is trace mitral regurgitation.     Tricuspid Valve:  Structurally normal tricuspid valve with normal leaflet excursion. There is mild tricuspid regurgitation.     Pulmonic Valve:  Structurally normal pulmonic valve with normal leaflet excursion. There is mild pulmonic regurgitation.     Aorta:  The aortic root at the sinuses of Valsalva is normal in size, measuring 3.00 cm (indexed 1.71 cm/m²). The ascending aorta diameter is normal in size, measuring 3.30 cm (indexed 1.88 cm/m²).     Pericardium:  There is a trace pericardial effusion.     Systemic Veins:  The inferior vena cava is normal in size measuring 1.60 cm in diameter, (normal <2.1cm) with normal inspiratory collapse (normal >50%) consistent with normal right atrial pressure (~3, range 0-5mmHg).

## 2024-02-16 NOTE — PROGRESS NOTE ADULT - ASSESSMENT
82yo Ukrainian speaking F w PMHx HTN, HLD, A-fib on Xarelto, adenomatous nodular goiter presenting to the ED with fatigue, SOB, and poor appetite for the past ~ 10 days. Prior to the recent onset of symptoms, son states pt was in relatively good health, able to walk and move around without any problem. Pt has not visited her cardiologist in several years. Pt found to have large b/l plefs, was briefly on BIPAP in ED weaned back to NC.  Pt admitted for likely acute decompensated (new-onset) heart failure and a-fib w RVR. Per cardio Dr. Chris Le, prefer admission to hospitalist w house cards to follow.

## 2024-02-16 NOTE — PROGRESS NOTE ADULT - ASSESSMENT
82 yo F w PMHx HTN, HLD, A-fib on Xarelto, adenomatous nodular goiter presenting to the ED with fatigue, SOB, with clinical evidence of volume overload concerning for acute decompensated heart failure. Chest CTA was negative for a PE but with bilateral pleural effusions in the setting of rapid atrial fibrillation and echo demonstrated severely reduced LV function with EF 20-25% with mid-moderate aortic regurgitation.     1. Acute decompensated heart failure.  LVEF 20-25% with mild-moderate AR  - Entresto 24/26mg BID  - Toprolol 25mg daily  - Aldactone 25mg daily  - PO lasix 40mg daily  - Fluid restriction 1.5L /day  - Patient can be discharged on current meds with close follow up in cardiology clinic.     2. Atrial Fibrillation ([id number] = 4 )   - Toprolol 25mg daily  - Xarelto 15mg daily  - Keep K > 4 and Mg >2  - Telemetry monitoring  - Will need outpatient consideration of rhythm control based on CASTLE-AF study    Roselyn Murry MD  Cardiology Fellow   82 yo F w PMHx HTN, HLD, A-fib on Xarelto, adenomatous nodular goiter presenting to the ED with fatigue, SOB, with clinical evidence of volume overload concerning for acute decompensated heart failure. Chest CTA was negative for a PE but with bilateral pleural effusions in the setting of rapid atrial fibrillation and echo demonstrated severely reduced LV function with EF 20-25% with mid-moderate aortic regurgitation.     1. Acute decompensated heart failure.  LVEF 20-25% with mild-moderate AR  - Entresto 49/51mg BID  - Toprolol 50mg daily  - Aldactone 25mg daily  - PO lasix 40mg daily  - Fluid restriction 1.5L /day  - Patient can be discharged on current meds with close follow up in cardiology clinic.     2. Atrial Fibrillation ([id number] = 4 )   - Toprolol 25mg daily  - Xarelto 15mg daily  - Keep K > 4 and Mg >2  - Telemetry monitoring  - Will need outpatient consideration of rhythm control based on CASTLE-AF study    Roselyn Murry MD  Cardiology Fellow

## 2024-02-16 NOTE — PROGRESS NOTE ADULT - PROBLEM SELECTOR PLAN 2
Per chart from 2018 used to be on Digoxin but not currently.  On Xarelto 20mg qd at home.    - [id number] = 4   - c/w Xarelto 15mg qd (for Cr clearance ~45, if CrCl > 50 back to 20mg)  - s/p Digoxin load 1mg total 2/13  - BB as above  - possible rhythm control agent outpatient

## 2024-02-16 NOTE — PROGRESS NOTE ADULT - PROVIDER SPECIALTY LIST ADULT
Cardiology
Internal Medicine

## 2024-02-17 ENCOUNTER — TRANSCRIPTION ENCOUNTER (OUTPATIENT)
Age: 84
End: 2024-02-17

## 2024-02-20 ENCOUNTER — TRANSCRIPTION ENCOUNTER (OUTPATIENT)
Age: 84
End: 2024-02-20

## 2024-02-21 ENCOUNTER — APPOINTMENT (OUTPATIENT)
Age: 84
End: 2024-02-21
Payer: MEDICARE

## 2024-02-21 PROBLEM — I10 ESSENTIAL (PRIMARY) HYPERTENSION: Chronic | Status: ACTIVE | Noted: 2024-02-12

## 2024-02-21 PROBLEM — E78.5 HYPERLIPIDEMIA, UNSPECIFIED: Chronic | Status: ACTIVE | Noted: 2024-02-12

## 2024-02-21 PROBLEM — E04.9 NONTOXIC GOITER, UNSPECIFIED: Chronic | Status: ACTIVE | Noted: 2024-02-12

## 2024-02-21 PROBLEM — I48.91 UNSPECIFIED ATRIAL FIBRILLATION: Chronic | Status: ACTIVE | Noted: 2024-02-12

## 2024-02-21 PROCEDURE — 99442: CPT | Mod: 93

## 2024-02-21 RX ORDER — ATORVASTATIN CALCIUM 40 MG/1
40 TABLET, FILM COATED ORAL
Refills: 0 | Status: ACTIVE | COMMUNITY
Start: 2024-02-21

## 2024-02-21 RX ORDER — SPIRONOLACTONE 25 MG/1
25 TABLET, FILM COATED ORAL DAILY
Refills: 0 | Status: ACTIVE | COMMUNITY
Start: 2024-02-21

## 2024-02-21 RX ORDER — DIGOXIN 125 UG/1
125 TABLET ORAL
Refills: 0 | Status: DISCONTINUED | COMMUNITY
End: 2024-02-21

## 2024-02-21 RX ORDER — METOPROLOL SUCCINATE 50 MG/1
50 TABLET, EXTENDED RELEASE ORAL DAILY
Refills: 0 | Status: ACTIVE | COMMUNITY

## 2024-02-21 RX ORDER — NITROFURANTOIN (MONOHYDRATE/MACROCRYSTALS) 25; 75 MG/1; MG/1
CAPSULE ORAL
Refills: 0 | Status: DISCONTINUED | COMMUNITY
End: 2024-02-21

## 2024-02-21 RX ORDER — METOPROLOL TARTRATE 25 MG/1
25 TABLET, FILM COATED ORAL
Refills: 0 | Status: DISCONTINUED | COMMUNITY
End: 2024-02-21

## 2024-02-21 RX ORDER — FUROSEMIDE 40 MG/1
40 TABLET ORAL DAILY
Refills: 0 | Status: ACTIVE | COMMUNITY
Start: 2024-02-21

## 2024-02-21 NOTE — PLAN
[FreeTextEntry1] : -Follow up with PCP Dr. Robison -Follow up with cardiologist -NP home visit next week -Continue home care services  Educated patient and family extensively on fall and safety precautions, aspiration precautions, good skin care, heart healthy diet, daily weights, and importance to notify MD/return to ED for any change in mental status or worsening s/s as reviewed. Reinforced provider follow up and medication compliance. 24/7 TCM contact provided and encouraged to call with any questions or concerns.

## 2024-02-21 NOTE — PHYSICAL EXAM
[de-identified] : Physical exam limited d/t telephonic encounter, patient alert and verbal and conversing normally with no distress noted

## 2024-02-21 NOTE — HISTORY OF PRESENT ILLNESS
[Home] : at home, [unfilled] , at the time of the visit. [Other Location: e.g. Home (Enter Location, City,State)___] : at [unfilled] [Family Member] : family member [Verbal consent obtained from patient] : the patient, [unfilled] [FreeTextEntry1] : F/u post hospital discharge TCM STAR Heart Failure  [de-identified] : Ms. Love is a 83 year old female with past medical history of HTN, HLD, A-fib on Xarelto, adenomatous nodular goiter presenting to the ED with fatigue, SOB, and poor appetite. CXR showed small b/l pleural effusions/adjacent interstitial infiltrate/atelectasis. ED POCUS TTE showed severely decreased cardiac contractility, plethoric IVC, large b/l pleural effusions favoring CHF vs less likely PNA.  CTA chest showed no PE, enlarged heterogenous thyroid gland, mod size b/l pleural effusions (R > L) w R middle lobe and b/l lower lobe multisegmented opacification. Pt was admitted for likely acute decompensated (new-onset) heart failure and a-fib w RVR.  Patient with no access to smart phone/laptop and does not have the ability to do the video telehealth, telephonic visit facilitated by son Zack who provided translation. Patient alert and verbal, able to converse in full sentences with no distress noted. Since home, doing well, appetite has improved and feeling stronger each day. She is ambulating with no DME and has walker. She was seen in the home by visiting nurse yesterday. No issues reported.  Monitoring daily weights, stable since home and ranging between 110-112lbs. Denies any SOB, CP, orthopnea, edema or any abnormal bruising/bleeding. No trouble swallowing.  She has follow up scheduled with PCP Dr. Bran next week, son unable to recall cardiologist name and amenable to with [employer] if unable to schedule, declining assistance at this time.

## 2024-02-26 ENCOUNTER — EMERGENCY (EMERGENCY)
Facility: HOSPITAL | Age: 84
LOS: 1 days | Discharge: HOME | End: 2024-02-26
Attending: EMERGENCY MEDICINE | Admitting: EMERGENCY MEDICINE
Payer: MEDICARE

## 2024-02-26 VITALS
HEART RATE: 103 BPM | HEIGHT: 58 IN | SYSTOLIC BLOOD PRESSURE: 98 MMHG | RESPIRATION RATE: 18 BRPM | DIASTOLIC BLOOD PRESSURE: 61 MMHG | TEMPERATURE: 98 F | OXYGEN SATURATION: 99 %

## 2024-02-26 LAB
ADD ON TEST-SPECIMEN IN LAB: SIGNIFICANT CHANGE UP
ALBUMIN SERPL ELPH-MCNC: 3.5 G/DL — SIGNIFICANT CHANGE UP (ref 3.3–5)
ALBUMIN SERPL ELPH-MCNC: 4.1 G/DL — SIGNIFICANT CHANGE UP (ref 3.3–5)
ALP SERPL-CCNC: 49 U/L — SIGNIFICANT CHANGE UP (ref 40–120)
ALP SERPL-CCNC: 61 U/L — SIGNIFICANT CHANGE UP (ref 40–120)
ALT FLD-CCNC: 12 U/L — SIGNIFICANT CHANGE UP (ref 4–33)
ALT FLD-CCNC: 14 U/L — SIGNIFICANT CHANGE UP (ref 4–33)
ANION GAP SERPL CALC-SCNC: 12 MMOL/L — SIGNIFICANT CHANGE UP (ref 7–14)
ANION GAP SERPL CALC-SCNC: 14 MMOL/L — SIGNIFICANT CHANGE UP (ref 7–14)
AST SERPL-CCNC: 22 U/L — SIGNIFICANT CHANGE UP (ref 4–32)
AST SERPL-CCNC: 51 U/L — HIGH (ref 4–32)
BASOPHILS # BLD AUTO: 0.07 K/UL — SIGNIFICANT CHANGE UP (ref 0–0.2)
BASOPHILS NFR BLD AUTO: 0.7 % — SIGNIFICANT CHANGE UP (ref 0–2)
BILIRUB SERPL-MCNC: 0.4 MG/DL — SIGNIFICANT CHANGE UP (ref 0.2–1.2)
BILIRUB SERPL-MCNC: 0.6 MG/DL — SIGNIFICANT CHANGE UP (ref 0.2–1.2)
BUN SERPL-MCNC: 33 MG/DL — HIGH (ref 7–23)
BUN SERPL-MCNC: 34 MG/DL — HIGH (ref 7–23)
CALCIUM SERPL-MCNC: 9 MG/DL — SIGNIFICANT CHANGE UP (ref 8.4–10.5)
CALCIUM SERPL-MCNC: 9.3 MG/DL — SIGNIFICANT CHANGE UP (ref 8.4–10.5)
CHLORIDE SERPL-SCNC: 98 MMOL/L — SIGNIFICANT CHANGE UP (ref 98–107)
CHLORIDE SERPL-SCNC: 99 MMOL/L — SIGNIFICANT CHANGE UP (ref 98–107)
CO2 SERPL-SCNC: 22 MMOL/L — SIGNIFICANT CHANGE UP (ref 22–31)
CO2 SERPL-SCNC: 30 MMOL/L — SIGNIFICANT CHANGE UP (ref 22–31)
CREAT SERPL-MCNC: 1.14 MG/DL — SIGNIFICANT CHANGE UP (ref 0.5–1.3)
CREAT SERPL-MCNC: 1.2 MG/DL — SIGNIFICANT CHANGE UP (ref 0.5–1.3)
EGFR: 45 ML/MIN/1.73M2 — LOW
EGFR: 48 ML/MIN/1.73M2 — LOW
EOSINOPHIL # BLD AUTO: 0.07 K/UL — SIGNIFICANT CHANGE UP (ref 0–0.5)
EOSINOPHIL NFR BLD AUTO: 0.7 % — SIGNIFICANT CHANGE UP (ref 0–6)
GLUCOSE SERPL-MCNC: 79 MG/DL — SIGNIFICANT CHANGE UP (ref 70–99)
GLUCOSE SERPL-MCNC: 90 MG/DL — SIGNIFICANT CHANGE UP (ref 70–99)
HCT VFR BLD CALC: 42.5 % — SIGNIFICANT CHANGE UP (ref 34.5–45)
HGB BLD-MCNC: 13.5 G/DL — SIGNIFICANT CHANGE UP (ref 11.5–15.5)
IANC: 6.27 K/UL — SIGNIFICANT CHANGE UP (ref 1.8–7.4)
IMM GRANULOCYTES NFR BLD AUTO: 0.4 % — SIGNIFICANT CHANGE UP (ref 0–0.9)
LYMPHOCYTES # BLD AUTO: 2.97 K/UL — SIGNIFICANT CHANGE UP (ref 1–3.3)
LYMPHOCYTES # BLD AUTO: 29.1 % — SIGNIFICANT CHANGE UP (ref 13–44)
MCHC RBC-ENTMCNC: 26 PG — LOW (ref 27–34)
MCHC RBC-ENTMCNC: 31.8 GM/DL — LOW (ref 32–36)
MCV RBC AUTO: 81.7 FL — SIGNIFICANT CHANGE UP (ref 80–100)
MONOCYTES # BLD AUTO: 0.77 K/UL — SIGNIFICANT CHANGE UP (ref 0–0.9)
MONOCYTES NFR BLD AUTO: 7.6 % — SIGNIFICANT CHANGE UP (ref 2–14)
NEUTROPHILS # BLD AUTO: 6.27 K/UL — SIGNIFICANT CHANGE UP (ref 1.8–7.4)
NEUTROPHILS NFR BLD AUTO: 61.5 % — SIGNIFICANT CHANGE UP (ref 43–77)
NRBC # BLD: 0 /100 WBCS — SIGNIFICANT CHANGE UP (ref 0–0)
NRBC # FLD: 0.02 K/UL — HIGH (ref 0–0)
NT-PROBNP SERPL-SCNC: 4651 PG/ML — HIGH
PLATELET # BLD AUTO: 266 K/UL — SIGNIFICANT CHANGE UP (ref 150–400)
POTASSIUM SERPL-MCNC: 4.6 MMOL/L — SIGNIFICANT CHANGE UP (ref 3.5–5.3)
POTASSIUM SERPL-MCNC: SIGNIFICANT CHANGE UP MMOL/L (ref 3.5–5.3)
POTASSIUM SERPL-SCNC: 4.6 MMOL/L — SIGNIFICANT CHANGE UP (ref 3.5–5.3)
POTASSIUM SERPL-SCNC: SIGNIFICANT CHANGE UP MMOL/L (ref 3.5–5.3)
PROT SERPL-MCNC: 6.6 G/DL — SIGNIFICANT CHANGE UP (ref 6–8.3)
PROT SERPL-MCNC: 7.1 G/DL — SIGNIFICANT CHANGE UP (ref 6–8.3)
RBC # BLD: 5.2 M/UL — SIGNIFICANT CHANGE UP (ref 3.8–5.2)
RBC # FLD: 15.8 % — HIGH (ref 10.3–14.5)
SODIUM SERPL-SCNC: 135 MMOL/L — SIGNIFICANT CHANGE UP (ref 135–145)
SODIUM SERPL-SCNC: 140 MMOL/L — SIGNIFICANT CHANGE UP (ref 135–145)
TROPONIN T, HIGH SENSITIVITY RESULT: 18 NG/L — SIGNIFICANT CHANGE UP
WBC # BLD: 10.19 K/UL — SIGNIFICANT CHANGE UP (ref 3.8–10.5)
WBC # FLD AUTO: 10.19 K/UL — SIGNIFICANT CHANGE UP (ref 3.8–10.5)

## 2024-02-26 PROCEDURE — 99223 1ST HOSP IP/OBS HIGH 75: CPT | Mod: FS

## 2024-02-26 PROCEDURE — 76937 US GUIDE VASCULAR ACCESS: CPT | Mod: 26

## 2024-02-26 PROCEDURE — 99233 SBSQ HOSP IP/OBS HIGH 50: CPT

## 2024-02-26 PROCEDURE — 93010 ELECTROCARDIOGRAM REPORT: CPT

## 2024-02-26 PROCEDURE — 71046 X-RAY EXAM CHEST 2 VIEWS: CPT | Mod: 26

## 2024-02-26 RX ORDER — SPIRONOLACTONE 25 MG/1
25 TABLET, FILM COATED ORAL DAILY
Refills: 0 | Status: DISCONTINUED | OUTPATIENT
Start: 2024-02-26 | End: 2024-03-01

## 2024-02-26 RX ORDER — METOPROLOL TARTRATE 50 MG
50 TABLET ORAL ONCE
Refills: 0 | Status: COMPLETED | OUTPATIENT
Start: 2024-02-26 | End: 2024-02-26

## 2024-02-26 RX ORDER — FUROSEMIDE 40 MG
20 TABLET ORAL ONCE
Refills: 0 | Status: DISCONTINUED | OUTPATIENT
Start: 2024-02-26 | End: 2024-02-26

## 2024-02-26 RX ORDER — ATORVASTATIN CALCIUM 80 MG/1
40 TABLET, FILM COATED ORAL AT BEDTIME
Refills: 0 | Status: DISCONTINUED | OUTPATIENT
Start: 2024-02-26 | End: 2024-03-01

## 2024-02-26 RX ORDER — FUROSEMIDE 40 MG
40 TABLET ORAL ONCE
Refills: 0 | Status: COMPLETED | OUTPATIENT
Start: 2024-02-26 | End: 2024-02-26

## 2024-02-26 RX ORDER — SACUBITRIL AND VALSARTAN 24; 26 MG/1; MG/1
1 TABLET, FILM COATED ORAL
Refills: 0 | Status: DISCONTINUED | OUTPATIENT
Start: 2024-02-26 | End: 2024-02-27

## 2024-02-26 RX ADMIN — Medication 40 MILLIGRAM(S): at 19:21

## 2024-02-26 RX ADMIN — Medication 50 MILLIGRAM(S): at 19:21

## 2024-02-26 RX ADMIN — ATORVASTATIN CALCIUM 40 MILLIGRAM(S): 80 TABLET, FILM COATED ORAL at 23:02

## 2024-02-26 RX ADMIN — SACUBITRIL AND VALSARTAN 1 TABLET(S): 24; 26 TABLET, FILM COATED ORAL at 23:02

## 2024-02-26 NOTE — ED ADULT NURSE NOTE - OBJECTIVE STATEMENT
Pt received to rm 24, awake and alert, A&OX4, ambulatory. Sent by her PCP for episode of rapid afib and hypotension in office. Arrives in afib, states she felt well all day.  Respirations even and unlabored. Denies CP, SOB, N/V, HA, dizziness, palpitations, blurry vision. 22G IV placed to L hand . Bed in lowest position, call bell within reach. Safety maintained.

## 2024-02-26 NOTE — ED CDU PROVIDER INITIAL DAY NOTE - OBJECTIVE STATEMENT
83-year-old female with past medical history of A-fib on Xarelto, CHF,  sent in by primary care provider for rapid A-fib and low blood pressure in the office today.  Patient was recently admitted to [facility] for CHF and A-fib and had follow up visit with PMD today. Does not have caridologist. Pt was discharged home on Toprol, Entresto, spironolactone, Lasix, Xarelto, and atorvastatin.  Patient has been feeling well denies any chest pain shortness of breath palpitations syncope.  [place]guill[place] speaking translated by rene Dixon at bedside.  Blood pressure improved on its own in the ED to 90s systolic.  Evaluated by electrophysiology recommending one-time dose of metoprolol 50 mg and Lasix 40 mg IV and to continue monitoring vitals and telemetry monitoring in CDU.

## 2024-02-26 NOTE — CONSULT NOTE ADULT - SUBJECTIVE AND OBJECTIVE BOX
Date of Admission:    CHIEF COMPLAINT:    HISTORY OF PRESENT ILLNESS:      Allergies    No Known Allergies    Intolerances    	    MEDICATIONS:                  PAST MEDICAL & SURGICAL HISTORY:  HTN (hypertension)      HLD (hyperlipidemia)      A-fib      Nodular goiter          FAMILY HISTORY:      SOCIAL HISTORY:    [ ] Non-smoker  [ ] Smoker  [ ] Alcohol      REVIEW OF SYSTEMS:  CONSTITUTIONAL: No fever, weight loss, or fatigue  EYES: No eye pain, visual disturbances, or discharge  ENMT:  No difficulty hearing, tinnitus, vertigo; No sinus or throat pain  NECK: No pain or stiffness  RESPIRATORY: No cough, wheezing, chills or hemoptysis; No Shortness of Breath  CARDIOVASCULAR: No chest pain, palpitations, passing out, dizziness, or leg swelling  GASTROINTESTINAL: No abdominal or epigastric pain. No nausea, vomiting, or hematemesis; No diarrhea or constipation. No melena or hematochezia.  GENITOURINARY: No dysuria, frequency, hematuria, or incontinence  NEUROLOGICAL: No headaches, memory loss, loss of strength, numbness, or tremors  SKIN: No itching, burning, rashes, or lesions   LYMPH Nodes: No enlarged glands  ENDOCRINE: No heat or cold intolerance; No hair loss  MUSCULOSKELETAL: No joint pain or swelling; No muscle, back, or extremity pain  PSYCHIATRIC: No depression, anxiety, mood swings, or difficulty sleeping  HEME/LYMPH: No easy bruising, or bleeding gums  ALLERY AND IMMUNOLOGIC: No hives or eczema	    [ ] All others negative	  [ ] Unable to obtain    PHYSICAL EXAM:  T(C): 36.8 (02-26-24 @ 12:27), Max: 36.8 (02-26-24 @ 12:27)  HR: 103 (02-26-24 @ 12:27) (103 - 103)  BP: 98/61 (02-26-24 @ 12:27) (98/61 - 98/61)  RR: 18 (02-26-24 @ 12:27) (18 - 18)  SpO2: 99% (02-26-24 @ 12:27) (99% - 99%)  Wt(kg): --  I&O's Summary      Appearance: Normal	  HEENT:   Normal oral mucosa, PERRL, EOMI	  Lymphatic: No lymphadenopathy  Cardiovascular: Normal S1 S2, No JVD, No murmurs, No edema  Respiratory: Lungs clear to auscultation	  Psychiatry: A & O x 3, Mood & affect appropriate  Gastrointestinal:  Soft, Non-tender, + BS	  Skin: No rashes, No ecchymoses, No cyanosis	  Neurologic: Non-focal  Extremities: Normal range of motion, No clubbing, cyanosis or edema  Vascular: Peripheral pulses palpable 2+ bilaterally      < from: TTE W or WO Ultrasound Enhancing Agent (02.13.24 @ 09:31) >  CONCLUSIONS:      1. The left ventricular cavity is normal. Left ventricular wall thickness is normal. Left ventricular systolic function is severely decreased with an ejection fraction visually estimated at 20 to 25%. There is global left ventricular hypokinesis. There is no evidence of a left ventricular thrombus.   2. Enlarged right ventricular cavity size andreduced systolic function.   3. Structurally normal mitral valve with normal leaflet excursion. There is calcification of the mitral valve annulus. There is trace mitral regurgitation.   4. The aortic valve appears trileaflet with normal systolic excursion. There is calcification of the aortic valve leaflets. There is mild to moderate aortic regurgitation.   5. No prior echocardiogram is available for comparison.    ________________________________________________________________________________________  FINDINGS:     Left Ventricle:  The left ventricular cavity is normal. Left ventricular wall thickness is normal. Left ventricular systolic function is severely decreased with an ejection fraction visually estimated at 20 to 25%. There is global left ventricular hypokinesis. There is no evidence of a left ventricular thrombus.     Right Ventricle:  The right ventricular cavity is enlarged in size and reduced systolic function.     Left Atrium:  The left atrium is normal with an indexed volume of 30.99 ml/m².     Right Atrium:  The right atrium is normal in size with an indexed volume of 14.81 ml/m² and an indexed area of 6.84 cm²/m².     Aortic Valve:  The aortic valve appears trileaflet with normal systolic excursion. There is calcification of the aortic valve leaflets. There is mild to moderate aortic regurgitation.     Mitral Valve:  Structurally normal mitral valve with normal leaflet excursion. There is calcification of the mitral valve annulus. There is trace mitral regurgitation.     Tricuspid Valve:  Structurally normal tricuspid valve with normal leaflet excursion. There is mild tricuspid regurgitation.     Pulmonic Valve:  Structurally normal pulmonic valve with normal leaflet excursion. There is mild pulmonic regurgitation.     Aorta:  The aortic root at the sinuses of Valsalva is normal in size, measuring 3.00 cm (indexed 1.71 cm/m²). The ascending aorta diameter is normal in size, measuring 3.30 cm (indexed 1.88 cm/m²).     Pericardium:  There is a trace pericardial effusion.     Systemic Veins:  The inferior vena cava is normal in size measuring 1.60 cm in diameter, (normal <2.1cm) with normal inspiratory collapse (normal >50%) consistent with normal right atrial pressure (~3, range 0-5mmHg).  ____________________________________________________________________  QUANTITATIVE DATA:  Left Ventricle Measurements: (Indexed to BSA)     IVSd (2D):   0.7 cm  LVPWd (2D):  0.7 cm  LVIDd (2D):  4.8 cm  LVIDs (2D):  4.0 cm  LV Mass:     107 g  60.9 g/m²  Visualized LV EF%: 20 to 25%     MV E Vmax:    0.87 m/s  MV A Vmax:    0.22 m/s  MV E/A:       3.87  e' lateral:   10.20 cm/s  e' medial:    6.09 cm/s  E/e' lateral: 8.53  E/e' medial:  14.29  E/e' Average: 10.68  MV DT:        204 msec    Aorta Measurements:(normal range) (Indexed to BSA)     Sinuses of Valsalva: 3.00 cm (2.7 - 3.3 cm)  Ao Asc prox:         3.30 cm       Left Atrium Measurements: (Indexed to BSA)  LA Diam 2D:        3.80 cm  LA Vol s, MOD A4C: 43.10 ml.  LA Vol s, MOD A2C: 67.40 ml.  LAVol s, MOD BP:  54.40 ml  30.99 ml/m²    Right Ventricle Measurements: Right Atrial Measurements:     TV Omayra. S':      7.07 cm/s    RA Vol:       26.00 ml  RV Base (RVID1): 3.8 cm       RA Vol Index: 14.81 ml/m²  RV Mid (RVID2):  2.8 cm       LVOT / RVOT/ Qp/Qs Data: (Indexed to BSA)  LVOT Diameter: 1.60 cm  LVOT Vmax:     1.24 m/s  LVOT VTI:      23.30 cm  LVOT SV:       46.8 ml  26.69 ml/m²    Aortic Valve Measurements:  AV Vmax:                1.4 m/s  AV Peak Gradient:       8.3 mmHg  AV MeanGradient:       4.0 mmHg  AV VTI:                 25.4 cm  AV VTI Ratio:           0.92  AoV EOA, Contin:        1.84 cm²  AoV EOA, Contin i:      1.05 cm²/m²  AoV Dimensionless Index 0.92  AR Vmax                 4.68 m/s  AR PHT                  1164 msec  AR [place]                1.38 m/s²    Mitral Valve Measurements:     MV E Vmax: 0.9 m/s  MV A Vmax: 0.2 m/s  MV E/A:    3.9       Tricuspid Valve Measurements:     RA Pressure: 3 mmHg    ________________________________________________________________________________________  Electronically signed on 2/14/2024 at 8:10:45 AM by Carlos Han M.D.    < end of copied text >    < from: CT Angio Chest PE Protocol w/ IV Cont (02.12.24 @ 15:13) >  IMPRESSION:    No pulmonary embolism.    Moderate-sized bilateral pleural effusions and probable multisegmental   atelectasis involving the right middle lobe and bilateral lower lobes.    < end of copied text >

## 2024-02-26 NOTE — ED ADULT TRIAGE NOTE - CHIEF COMPLAINT QUOTE
Pt was recently in hospital for afib , pt sent in by her doctor found to be in rapid afib in office with low b/p. Pt denies sob or chest pain no co of dizziness.

## 2024-02-26 NOTE — ED CDU PROVIDER INITIAL DAY NOTE - ATTENDING APP SHARED VISIT CONTRIBUTION OF CARE
The decision was made to admit this patient to the CDU as documented in my Provider note I have reviewed the note  written by the CDU Physician Assistant. I have supervised and participated as necessary in the performance of procedures indicated for patient management and was available at all phases of the patient´s visit when needed.    Please see the  provider note for the details of the decision to admit  Vital Signs unchanged  PE as documented   Pt admitted to CDU for longer interval of monitoring and for "weigh in" by other subspecialties (eps)   Pt with uneventful course   will reassess

## 2024-02-26 NOTE — CONSULT NOTE ADULT - NS ATTEND AMEND GEN_ALL_CORE FT
82 yo [place]az[place] speaking F with a PMH of, HTN, adenomatous nodular goiter, and AFib on Xarelto for at least 3 yrs now who was recently hospitalized earlier this month for new acutely decompensated HF (LVEF 20-25%) w/ BNP ~15K and rapid AFib in the 130s; managed on BIPAP briefly in the ED, Dig loaded, diuresed and dc'd on GDMT for HF. Xarelto adjusted for CrCl.   Plan to cont rate control, increase metop to 50 bid, IV lasix tonight will fu for plan to dc tomorrow.

## 2024-02-26 NOTE — CONSULT NOTE ADULT - SUBJECTIVE AND OBJECTIVE BOX
HPI/CC:  Asked to see this patient for Afib with RVR. This is an 82 yo [place]itanian speaking F with a PMH of, HTN, adenomatous nodular goiter, and AFib on Xarelto for at least 3 yrs now who was recently hospitalized earlier this month for new acutely decompensated HF (LVEF 20-25%) and rapid AFib in the 130s; managed with ago BIPAP, briefly in the ED, IV Dilt, Dig loading, diuresis and dc'd on GDMT for HF. Xarelto adjusted for CrCl.     Today, pt was incidentally found to be tachycardic in her PCP's office with SBPs in the 80s. Son Zack reports no symptoms, improved appetite but a 3lbs weight gain. In the ED pt was found to be in Afib V rates 130s, treated     PAST MEDICAL & SURGICAL HISTORY:  HTN (hypertension)      HLD (hyperlipidemia)      A-fib      Nodular goiter      	  FAMILY HISTORY:    DRUG ALLERGIES:  No Known Allergies    MEDICATIONS:  furosemide   Injectable 40 milliGRAM(s) IV Push once  metoprolol tartrate 50 milliGRAM(s) Oral once  sacubitril 49 mG/valsartan 51 mG 1 Tablet(s) Oral two times a day  spironolactone 25 milliGRAM(s) Oral daily      SOCIAL HISTORY:    Denies illicit drug use or tobacco use  Denies alcohol 	    REVIEW OF SYSTEMS:  CONSTITUTIONAL: No fever, weight loss, or fatigue  EYES: No eye pain, visual disturbances, or discharge  ENMT:  No difficulty hearing, tinnitus, vertigo; No sinus or throat pain  NECK: No pain or stiffness  RESPIRATORY: No cough, wheezing, chills or hemoptysis; No Shortness of Breath  CARDIOVASCULAR: No chest pain, palpitations, passing out, dizziness, or leg swelling  GASTROINTESTINAL: No abdominal or epigastric pain. No nausea, vomiting, or hematemesis; No diarrhea or constipation. No melena or hematochezia.  GENITOURINARY: No dysuria, frequency, hematuria, or incontinence  NEUROLOGICAL: No headaches, memory loss, loss of strength, numbness, or tremors  SKIN: No itching, burning, rashes, or lesions   LYMPH Nodes: No enlarged glands  ENDOCRINE: No heat or cold intolerance; No hair loss  MUSCULOSKELETAL: No joint pain or swelling; No muscle, back, or extremity pain  PSYCHIATRIC: No depression, anxiety, mood swings, or difficulty sleeping  HEME/LYMPH: No easy bruising, or bleeding gums  ALLERGY AND IMMUNOLOGIC: No hives or eczema	  All others negative	    PHYSICAL EXAM:  T(C): 36.7 (02-26-24 @ 18:32), Max: 36.8 (02-26-24 @ 12:27)  HR: 98 (02-26-24 @ 18:32) (98 - 110)  BP: 117/78 (02-26-24 @ 18:32) (98/61 - 117/78)  RR: 18 (02-26-24 @ 18:32) (18 - 18)  SpO2: 100% (02-26-24 @ 18:32) (99% - 100%)  Wt(kg): --  I&O's Summary      Appearance: Normal	  HEENT:   Normal oral mucosa, PERRL, EOMI	  Lymphatic: No lymphadenopathy  Cardiovascular: Normal S1 S2, No JVD, No murmurs, No edema  Chest: clear  Respiratory: Lungs clear to auscultation	  Psychiatry: A & O x 3, Mood & affect appropriate  Gastrointestinal:  Soft, Non-tender, + BS	  Skin: No rashes, No ecchymoses, No cyanosis	  Neurologic: Non-focal  Extremities: Normal range of motion, No clubbing, cyanosis or edema  Vascular: Peripheral pulses palpable 2+ bilaterally    DIAGNOSTICS:  TELEMETRY: as above  12 Lead ECG: N/A  CATH: N/A  2D TTE: N/A  VALARIE: N/A	  RADIOLOGY: N/A    OTHER: N/A    LABS:	 	                          13.5   10.19 )-----------( 266      ( 26 Feb 2024 14:30 )             42.5     02-26    135  |  99  |  34<H>  ----------------------------<  90  SeeComment SPECIMEN SEVERELY HEMOLYZED   |  22  |  1.14    Ca    9.0      26 Feb 2024 14:30    TPro  6.6  /  Alb  3.5  /  TBili  0.4  /  DBili  x   /  AST  51<H>  /  ALT  14  /  AlkPhos  49  02-26    proBNP:      HPI/CC: Asked to see this patient for Afib with RVR. This is an 84 yo Romansh speaking F with a PMH of, HTN, adenomatous nodular goiter, and AFib on Xarelto for at least 3 yrs now who was recently hospitalized earlier this month for new acutely decompensated HF (LVEF 20-25%) w/ BNP ~15K and rapid AFib in the 130s; managed on BIPAP briefly in the ED, Dig loaded, diuresed and dc'd on GDMT for HF. Xarelto adjusted for CrCl.     Today, pt was incidentally found to be tachycardic in her PCP's office with SBPs in the 80s. Son Zack [phone] pt has had an improvement in her appetite and a 3lbs weight gain. In the ED pt was found to be in Afib V rates 110-120s; now 90-100s. BNP ~4600.    PAST MEDICAL & SURGICAL HISTORY:  HTN (hypertension)  HLD (hyperlipidemia)  A-fib  Nodular goiter  	  FAMILY HISTORY:    DRUG ALLERGIES:  No Known Allergies    MEDICATIONS:  furosemide   Injectable 40 milliGRAM(s) IV Push once  metoprolol tartrate 50 milliGRAM(s) Oral once  sacubitril 49 mG/valsartan 51 mG 1 Tablet(s) Oral two times a day  spironolactone 25 milliGRAM(s) Oral daily    SOCIAL HISTORY:    Denies illicit drug use or tobacco use  Denies alcohol 	    REVIEW OF SYSTEMS:  CONSTITUTIONAL: No fever, weight loss, or fatigue  EYES: No eye pain, visual disturbances, or discharge  ENMT:  No difficulty hearing, tinnitus, vertigo; No sinus or throat pain  NECK: No pain or stiffness  RESPIRATORY: No cough, wheezing, chills or hemoptysis; No Shortness of Breath  CARDIOVASCULAR: No chest pain, palpitations, passing out, dizziness, or leg swelling  GASTROINTESTINAL: No abdominal or epigastric pain. No nausea, vomiting, or hematemesis; No diarrhea or constipation. No melena or hematochezia.  GENITOURINARY: No dysuria, frequency, hematuria, or incontinence  NEUROLOGICAL: No headaches, memory loss, loss of strength, numbness, or tremors  SKIN: No itching, burning, rashes, or lesions   LYMPH Nodes: No enlarged glands  ENDOCRINE: No heat or cold intolerance; No hair loss  MUSCULOSKELETAL: No joint pain or swelling; No muscle, back, or extremity pain  PSYCHIATRIC: No depression, anxiety, mood swings, or difficulty sleeping  HEME/LYMPH: No easy bruising, or bleeding gums  ALLERGY AND IMMUNOLOGIC: No hives or eczema	  All others negative	    PHYSICAL EXAM:  T(C): 36.7 (24 @ 18:32), Max: 36.8 (24 @ 12:27)  HR: 98 (24 @ 18:32) (98 - 110)  BP: 117/78 (24 @ 18:32) (98/61 - 117/78)  RR: 18 (24 @ 18:32) (18 - 18)  SpO2: 100% (24 @ 18:32) (99% - 100%)  Wt(kg): --  I&O's Summary    Appearance: Normal	  HEENT:   Normal oral mucosa, PERRL, EOMI	  Lymphatic: No lymphadenopathy  Cardiovascular: Normal S1 S2, irregularly irregular No JVD, No murmurs, No edema  Chest: clear  Respiratory: Lungs clear to auscultation	  Psychiatry: A & O x 3, Mood & affect appropriate  Gastrointestinal:  Soft, Non-tender, + BS	  Skin: No rashes, No ecchymoses, No cyanosis	  Neurologic: Non-focal  Extremities: Normal range of motion, No clubbing, cyanosis or edema  Vascular: Peripheral pulses palpable 2+ bilaterally    DIAGNOSTICS:  TELEMETRY: Afib 100s  12 Lead EC2024, Afib, V rate 92 bpm.  CATH: N/A  2D TTE:  < from: TTE W or WO Ultrasound Enhancing Agent (24 @ 09:31) >    TRANSTHORACIC ECHOCARDIOGRAM REPORT  ________________________________________________________________________________                                      _______       Pt. Name:       CRESCENCIO ESPINOSA Study Date:    2024  MRN:            [id number]        YOB: 1940  Accession #:    [id number]        Age:           83 years  Account#:       [id number]         Gender:        F  Heart Rate:     76 bpm           Height:        64.00 in (162.56 cm)  Rhythm:                          Weight:        155.00 lb (70.31 kg)  Blood Pressure: 137/67 mmHg      BSA/BMI:       1.76 m² / 26.61 kg/m²  ________________________________________________________________________________________  Referring Physician:    [id number] Chris Le  Interpreting Physician: Carlos Han M.D.  Primary Sonographer:    Krystle Blakely RDCS    CPT:                ECHO TTE WITH CON COMP W DOPP - .m;DEFINITY ECHO                      CONTRAST PER ML - .m;3D RECONST W/O WKSTATION -  34572.m;MYOCARDIAL STRAIN IMAGING - 31167.m  Indication(s):      Cardiomyopathy, unspecified - I42.9  Procedure:          Transthoracic echocardiogram with 2-D, M-mode and complete                      spectral and color flow Doppler. Strain imaging performed                      for evaluation of regional and global myocardial shape and                      dimensions. Real time and full volume 3-dimensional imaging                      performed at the echo machine.  [facility]  Admission Status:   Inpatient  Contrast Injection: Verbal consent was obtained for injection of Ultrasonic                      Enhancing Agent following a discussion of risks and                      benefits.                      Endocardial visualization enhanced with 2 ml of Definity                      Ultrasound enhancing agent (Lot#:[id number] Exp.Date:).  UEA Reaction:       Patient had no adverse reaction after injection of                      Ultrasound Enhancing Agent.  Study Information:  Image quality for this study is fair.    _______________________________________________________________________________________     CONCLUSIONS:      1. The left ventricular cavity is normal. Left ventricular wall thickness is normal. Left ventricular systolic function is severely decreased with an ejection fraction visually estimated at 20 to 25%. There is global left ventricular hypokinesis. There is no evidence of a left ventricular thrombus.   2. Enlarged right ventricular cavity size andreduced systolic function.   3. Structurally normal mitral valve with normal leaflet excursion. There is calcification of the mitral valve annulus. There is trace mitral regurgitation.   4. The aortic valve appears trileaflet with normal systolic excursion. There is calcification of the aortic valve leaflets. There is mild to moderate aortic regurgitation.   5. No prior echocardiogram is available for comparison.    < end of copied text >    VALARIE: N/A	  RADIOLOGY:   < from: Xray Chest 2 Views PA/Lat (24 @ 16:40) >    ACC: [id number] EXAM:  XR CHEST PA LAT 2V   ORDERED BY: MARY MOFFETT     PROCEDURE DATE:  2024          INTERPRETATION:  EXAMINATION: XR CHEST PA AND LATERAL    CLINICAL INDICATION: Chest Pain    TECHNIQUE: 2 views; Frontal and lateral views of the chest were obtained.    COMPARISON: Chest radiograph 2024.    FINDINGS:  The heart is normal in size.  The lungs are clear.  Trace left pleural effusion, decreased from previous exam.  No pneumothorax.  No acute bony abnormality.    IMPRESSION:  No focal consolidation.  Trace left pleural effusion, decreased from previous exam.    --- End of Report ---          BERNARDA WOOSD MD; Resident Radiologist  This document has been electronically signed.    < end of copied text >      OTHER: N/A    LABS:	 	                          13.5   10.19 )-----------( 266      ( 2024 14:30 )             42.5         135  |  99  |  34<H>  ----------------------------<  90  SeeComment SPECIMEN SEVERELY HEMOLYZED   |  22  |  1.14    Ca    9.0      2024 14:30    TPro  6.6  /  Alb  3.5  /  TBili  0.4  /  DBili  x   /  AST  51<H>  /  ALT  14  /  AlkPhos  49      proBNP: approx 4600

## 2024-02-26 NOTE — ED CDU PROVIDER INITIAL DAY NOTE - CLINICAL SUMMARY MEDICAL DECISION MAKING FREE TEXT BOX
83-year-old female with past medical history of A-fib on Xarelto, CHF,  sent in by primary care provider for rapid A-fib and low blood pressure in the office today.  Patient was recently admitted to [facility] for CHF and A-fib and had follow up visit with PMD today. Does not have cardiologist. Pt was discharged home on Toprol, Entresto, spironolactone, Lasix, Xarelto, and atorvastatin.  Patient has been feeling well denies any chest pain shortness of breath palpitations syncope.  Blood pressure improved on its own in the ED to 90s systolic.  Evaluated by electrophysiology recommending one-time dose of metoprolol 50 mg and Lasix 40 mg IV and to continue monitoring vitals and telemetry monitoring in CDU.

## 2024-02-26 NOTE — ED ADULT TRIAGE NOTE - HOW PATIENT ADDRESSED, PROFILE
Mavis Brooke  [address]  [place] [address]      Dr. Magan Rodriguez  [address] [place] [address]  ** Report to [facility]  **Please call [phone] with any questions    Please follow these instructions. Disregard instructions that are provided on the medication package. NuLytely may be picked up from:     Date of Procedure: January 9, 2018  Check in at: 8am   Procedure at: 9:30am    Colonoscopy Preparation Instructions  ?**Please make arrangements for a responsible adult to drive you home. (A responsible adult is someone age 18 or older who can receive and understand instructions, stay with you, and call for assistance as instructed).**    Regarding Your Medications Prior to Your Procedure: Do not take any iron or iron-containing multivitamins beginning five days prior to your procedure.    • Blood-thinners typically need to be stopped a few days prior to the procedure. Check with your prescribing physician if you take blood thinners such as Coumadin (warfarin), Plavix, Pradaxa, Eliquis, Xarelto or Brilinta.  • Do not take aspirin or anti-inflammatory medications (Advil, Motrin, ibuprofen, Aleve, naproxen), in the morning on the day of your procedure.    The Day Before the Procedure: Start a clear liquid diet at breakfast. Continue a clear liquid diet for the entire day in unlimited amounts. Clear liquids are listed below.   • In the morning, add tap water to the laxative container, shake well and place in the refrigerator. Lemonade Crystal Lite mix, obtained at the [facility], can also be used to improve flavor. Once water is added, the solution is good for 48 hours.   • At approximately 5 PM, (or when you are home for the evening), begin drinking the solution. Drink 8 oz. Every 20 minutes until you have consumed the entire gallon of the solution. Walking will help the laxative move through the colon.     The Day of the Procedure: Hold furosemide (LASIX) 20 MG tablet the  morning of the procedure. You may take your other medications with a sip of water. Do not have anything by mouth after 7 AM. Clear Liquid Diet: Do not consume anything red or purple. Beverages: soft drinks/soda, Gatorade or Bayron-Aid, clear fruit juices without pulp, water, tea, coffee (no milk or non dairy creamer). Broths: chicken, beef or vegetable. Desserts: hard candies, Jell-O, Popsicles. (No fruit bars or sherbet). If stools are not clear yellow the morning of the procedure, please call the GI lab at [phone]   trevor

## 2024-02-26 NOTE — ED PROVIDER NOTE - ATTENDING CONTRIBUTION TO CARE
The patient is a 83y Female who has a past medical and surgery history of HTN A-fib Nodular goiter PTED with Pt was recently in hospital for afib , pt sent in by her doctor found to be in rapid afib in office with low b/p. Pt denies sob or chest pain no co of dizziness.     Vital Signs Last 24 Hrs  T(F): 98.2 HR: 103 BP: 98/61 RR: 18 SpO2: 99% (26 Feb 2024 12:27) (99% - 99%)  PE: as described; my additions and exceptions are noted in the chart    DATA:  EKG: UCAF in 100-110 range on monitor EKG same w/ minimal change c/w prior  LAB: Pending at time of evaluation    AF IMPRESSION:  No pulmonary embolism.  Moderate-sized bilateral pleural effusions and probable multisegmental   atelectasis involving the right middle lobe and bilateral lower lobes.    IMPRESSION/RISK:  Dx= UCAF  Consideration include Patient with known CHF AF relatively asymptomatic primarily here at PMDs request Patient BP might be normal for her   Plan  labs trop BNP   d/c with Dr Thomas of hospitalist [facility] given pt recent d/c will check labs and have cardiology/chf service "weigh in" prior to decsision to admit   Will reassess  Visit might very well =CDU

## 2024-02-26 NOTE — ED PROVIDER NOTE - PROGRESS NOTE DETAILS
Seamus Dee,  (PGY-1) Patient reevaluated at bedside. Patient is doing well. Spoke with hospitalist who had put out a call to the heart failure team. HF PA saw the patient however states that her attending will not be available to come see the patient today so the consult can not be completed today. Cardiology consult was placed and patient has instead been added to the EP team list who will come to see the patient. Spoke with son who is aware. Pending recommendations, will likely CDU the patient. Patient remains hemodynamically stable at this time, no clinical signs of fulminant HF. Seamus Dee DO (PGY-1) Patient reevaluated at bedside. Patient is doing well. After face to face discussion with Dr. Lexus WHEAT, and after reviewing patient's vitals and medications they recommend a dose of metoprolol 50 for rate control, and Lasix for diuresis. Patient to go to CDU for observation and EP follow up.

## 2024-02-26 NOTE — ED ADULT NURSE NOTE - SUICIDE SCREENING QUESTION 2
Call to patient. Voice mail. Dosing instructions left for patient verbally on voice mail. Clinic number provided and advised to call the clinic with any questions or concerns, medication or dietary changes or if dosing is different than what is listed.     Anticoagulation Summary  As of 2023    INR goal:  1.5-2.5   TTR:  53.7 % (6.8 y)   INR used for dosin.3 (2023)   Warfarin maintenance plan:  3 mg (2 mg x 1.5) every M, W, F; 5 mg (2 mg x 2.5) all other days   Weekly warfarin total:  29 mg   Plan last modified:  Dee Villa, RN (2022)   Next INR check:  5/3/2023   Priority:  Follow-Up - 2 Weeks   Target end date:  Indefinite    Indications    Encounter for therapeutic drug monitoring [Z51.81]  Atrial fibrillation  paroxysmal [I48.0]  Warfarin anticoagulation [Z79.01]  Atrial flutter  post op [I48.91]             Anticoagulation Episode Summary     INR check location:  Outside Lab    Preferred lab:      Send INR reminders to:  [facility] (OPEN ENROLLMENT) ACS CARD/EP    Comments:  *Next  STAC due 23; **INR GOAL: 1.8-2.2; Home monitor; *cc'd lab 3/10/23: 2mg tablets      Anticoagulation Care Providers     Provider Role Specialty Phone number    New Murdock MD Responsible Internal Medicine - Clinical Cardiac Electrophysiology [phone]          Supervising provider: Jayesh Meza MD     Chief Complaint   Patient presents with   • Follow-Up   • Chronic Kidney Disease           HPI:  Fede Parikh Jr. is a 77 y.o. male with HTN, CKD, COPD, PVD, episode of CHARANJIT in 9/2020 who presents today for follow up.     He complains of SOB, says his inhaler ran out.  I refilled his albuterol prescription for him.    Re: CKD, patient had SHANNAN episode in September 2020, with peak creatinine 4.2, likely from contrast-induced nephropathy.  Creatinine improved to 2.1 by discharge 5 days later.  Baseline creatinine prior to September 2020 seemed to hover between 1.5 and 1.9. No urinary troubles. Denies NSAIDs.     Re: PVD, he was admitted in September 2020 with severe peripheral arterial disease, requiring vascular surgery, complicated by SHANNAN on CKD. He uses a rolling walker. No leg pain today.     Re: HTN, he doesn't check BP at home. He denies light-headedness.         Past Medical History:   Diagnosis Date   • Hepatitis    • Hypertension    • Pulmonary emphysema (HCC)        Outpatient Encounter Medications as of 9/15/2020   Medication Sig Dispense Refill   • fluocinolone (SYNALAR) 0.025 % cream Apply 1 Application to affected area(s) 3 times a day as needed. 1 Tube 0   • tiotropium (SPIRIVA HANDIHALER) 18 MCG Cap INHALE 1 CAPSULE VIA HANDIHALER ONCE DAILY 90 Cap 3   • Fluticasone Furoate-Vilanterol (BREO ELLIPTA) 100-25 MCG/INH AEROSOL POWDER, BREATH ACTIVATED Inhale 1 Puff by mouth every day. 1 Each 2   • albuterol (VENTOLIN HFA) 108 (90 Base) MCG/ACT Aero Soln inhalation aerosol INHALE TWO PUFFS BY MOUTH EVERY SIX HOURS AS NEEDED 1 Inhaler 3   • aspirin (ASPIRIN LOW DOSE) 81 MG EC tablet TAKE ONE TABLET BY MOUTH ONE TIME DAILY 30 Tab 11   • atorvastatin (LIPITOR) 40 MG Tab Take 1 Tab by mouth every day. 30 Tab 11   • amLODIPine (NORVASC) 5 MG Tab Take 1 Tab by mouth every day. For high blood pressure 30 Tab 11   • loratadine (CLARITIN) 10 MG Tab Take 1 Tab by mouth every day. 30 Tab 5   • oxyCODONE  "immediate-release (ROXICODONE) 5 MG Tab Take 0.5-1 Tabs by mouth every 8 hours as needed for Severe Pain for up to 5 days. 15 Tab 0   • omeprazole (PRILOSEC) 20 MG delayed-release capsule TAKE ONE CAPSULE BY MOUTH ONE TIME DAILY 30 Cap 5   • hydrOXYzine HCl (ATARAX) 25 MG Tab Take 1 Tab by mouth 2 times a day as needed for Itching. 60 Tab 1   • ergocalciferol (DRISDOL) 34456 UNIT capsule Take 1 Cap by mouth every 7 days. 8 Cap 0     No facility-administered encounter medications on file as of 9/15/2020.         No Known Allergies    Review of Systems   Constitutional: Negative for fever.   Respiratory: Negative for shortness of breath.    Cardiovascular: Negative for chest pain.   Gastrointestinal: Negative for abdominal pain.   All other systems reviewed and are negative.      /70 (BP Location: Right arm, Patient Position: Sitting)   Pulse (!) 102   Temp 36.7 °C (98 °F)   Resp 14   Ht 1.727 m (5' 8\")   Wt 67.1 kg (148 lb)   SpO2 99%   BMI 22.50 kg/m²     Physical Exam   Constitutional: He is oriented to person, place, and time and well-developed, well-nourished, and in no distress. No distress.   AA man   HENT:   Mouth/Throat: Oropharynx is clear and moist. No oropharyngeal exudate.   Eyes: EOM are normal. No scleral icterus.   Neck: Neck supple. No tracheal deviation present.   Cardiovascular: Normal rate, regular rhythm and normal heart sounds.   No murmur heard.  Pulmonary/Chest: Effort normal and breath sounds normal. No stridor. He has no rales.   Abdominal: Soft. Bowel sounds are normal. There is no abdominal tenderness.   Musculoskeletal: Normal range of motion.         General: No edema.   Neurological: He is alert and oriented to person, place, and time.   Skin: Skin is warm and dry. No rash noted.   Psychiatric: Mood and affect normal.         Labs reviewed.  Recent Labs     09/03/20  1723  09/05/20  0407 09/06/20  0033 09/07/20  0215 09/08/20  0227 09/09/20  0906 09/10/20  0253   ALBUMIN 4.2 "  --  3.2  --   --   --   --   --    HDL  --   --  73  --   --   --   --   --    TRIGLYCERIDE  --   --  89  --   --   --   --   --    SODIUM 138  --  133* 135 136 138 139 135   POTASSIUM 4.2  --  4.8 4.2 4.1 4.0 4.1 3.8   CHLORIDE 101  --  98 100 101 104 102 102   CO2 10*  --  20 21 21 22 25 23   BUN 17  --  28* 33* 29* 26* 19 21   CREATININE 1.51*  --  3.07* 4.18* 3.38* 2.63* 2.30* 2.10*   PHOSPHORUS  --    < > 4.4 2.9 2.8 2.6  --   --     < > = values in this interval not displayed.       Lab Results   Component Value Date/Time    WBC 6.1 09/08/2020 02:27 AM    RBC 2.70 (L) 09/08/2020 02:27 AM    HEMOGLOBIN 9.0 (L) 09/08/2020 02:27 AM    HEMATOCRIT 25.3 (L) 09/08/2020 02:27 AM    MCV 93.7 09/08/2020 02:27 AM    MCH 33.3 (H) 09/08/2020 02:27 AM    MCHC 35.6 (H) 09/08/2020 02:27 AM    MPV 10.7 09/08/2020 02:27 AM              URINALYSIS:  Lab Results   Component Value Date/Time    COLORURINE Yellow 09/09/2020 0014    CLARITY Clear 09/09/2020 0014    SPECGRAVITY 1.015 09/09/2020 0014    PHURINE 7.5 09/09/2020 0014    KETONES Negative 09/09/2020 0014    PROTEINURIN Negative 09/09/2020 0014    BILIRUBINUR Negative 09/09/2020 0014    UROBILU 0.2 09/09/2020 0014    NITRITE Negative 09/09/2020 0014    LEUKESTERAS Negative 09/09/2020 0014    OCCULTBLOOD Trace (A) 09/09/2020 0014     [facility]  Lab Results   Component Value Date/Time    TOTPROTUR 26.0 (H) 09/06/2020 1239      Lab Results   Component Value Date/Time    CREATININEU 73.47 09/06/2020 1239    CREATININEU 74.62 09/06/2020 1239       Imaging reviewed  No orders to display         Assessment:  Fede Parikh Jr. is a 77 y.o. male with HTN, CKD, COPD, PVD, episode of CHARANJIT in 9/2020 who presents today for follow up.     Plan:  1. SHANNAN (acute kidney injury) due to contrast-induced nephropathy (HCC)  -Noted during hospitalization in September 2020, likely due to contrast-induced nephropathy.  Improving by time of discharge.  Avoid NSAIDs and other nephrotoxins.    2. CKD  (chronic kidney disease), stage III (HCC)  -Patient likely has CKD from history of SHANNAN, hypertension, and age-related kidney decline.  Avoid NSAIDs and other nephrotoxins.  Recommend start lisinopril for long-term renal protection.    3. Essential hypertension  -Well-controlled.  Start lisinopril 5 mg p.o. daily for long-term renal protection.    4. Nicotine dependence  - I spent over 3 minutes discussing the need for tobacco cessation. I discussed pharmacotherapy measures for quitting including replacements such as nicotine gum / nicotine patch.     5. Chronic obstructive pulmonary disease, unspecified COPD type (HCC)  -Normal exam today, the patient symptomatic.  I refilled the patient's albuterol inhaler.  Further management per primary care.      RTC 4 months with pre-clinic labs    Mushtaq Rutledge MD  Nephrology       No

## 2024-02-26 NOTE — CONSULT NOTE ADULT - ASSESSMENT
82 yo Rwandan speaking F with a PMH of, HTN, adenomatous nodular goiter, and AFib on Xarelto for at least 3 yrs now who was recently hospitalized earlier this month for new acutely decompensated HF (LVEF 20-25%) w/ BNP ~15K and rapid AFib in the 130s; managed on BIPAP briefly in the ED, Dig loaded, diuresed and dc'd on GDMT for HF. Xarelto adjusted for CrCl.     Today, pt was incidentally found to be tachycardic in her PCP's office with SBPs in the 80s; without symptoms. In the ED pt was found to be in Afib V rates 110-120s; now 90-100s. BNP ~4600. CXR clear; CHA2 DS2 VASc, 4-5:    - pt seen and evaluated with Dr. Alberts  - IV Lasix 80mg total this evening as BP tolerates  - metoprolol 50mg orally x 1  - monitor on telemetry  - optimize lytes as needed  - will follow

## 2024-02-26 NOTE — ED PROVIDER NOTE - OBJECTIVE STATEMENT
The patient is a 84 y/o Female who has a past medical and surgical history of HTN, A-fib, Nodular goiter PTED with Pt was recently in hospital for afib , pt sent in by her doctor found to be in rapid afib in office with low b/p. Pt denies sob or chest pain no co of dizziness. Patient endorses no complaints, her son at bedside provides translation per patient request (she speaks some english). Son assists in history.   Patient denies any fevers, chills, nausea, vomiting, chest pain, diarrhea, constipation, painful urination, new rashes.

## 2024-02-26 NOTE — ED PROVIDER NOTE - CLINICAL SUMMARY MEDICAL DECISION MAKING FREE TEXT BOX
The patient is a 84 y/o Female who has a past medical and surgical history of HTN, A-fib, Nodular goiter PTED with Pt was recently in hospital for afib , pt sent in by her doctor found to be in rapid afib in office with low b/p. Pt denies sob or chest pain no co of dizziness. Patient endorses no complaints, her son at bedside provides translation per patient request (she speaks some english). Son assists in history.   Patient denies any fevers, chills, nausea, vomiting, chest pain, diarrhea, constipation, painful urination, new rashes.  VSS, mild tachycardia   T(F): 98.2 HR: 103 BP: 98/61 RR: 18 SpO2: 99% (26 Feb 2024 12:27) (99% - 99%)  EKG with ventricular rate 99  On exam patient well appearing.   DDx- afib rvr without clinical signs of decompensated HF.

## 2024-02-27 VITALS
HEART RATE: 105 BPM | RESPIRATION RATE: 18 BRPM | OXYGEN SATURATION: 97 % | SYSTOLIC BLOOD PRESSURE: 112 MMHG | TEMPERATURE: 98 F | DIASTOLIC BLOOD PRESSURE: 71 MMHG

## 2024-02-27 LAB — TROPONIN T, HIGH SENSITIVITY RESULT: 22 NG/L — SIGNIFICANT CHANGE UP

## 2024-02-27 PROCEDURE — 99231 SBSQ HOSP IP/OBS SF/LOW 25: CPT

## 2024-02-27 PROCEDURE — 99239 HOSP IP/OBS DSCHRG MGMT >30: CPT

## 2024-02-27 RX ORDER — RIVAROXABAN 15 MG-20MG
15 KIT ORAL
Refills: 0 | Status: DISCONTINUED | OUTPATIENT
Start: 2024-02-27 | End: 2024-03-01

## 2024-02-27 RX ORDER — METOPROLOL TARTRATE 50 MG
50 TABLET ORAL ONCE
Refills: 0 | Status: COMPLETED | OUTPATIENT
Start: 2024-02-27 | End: 2024-02-27

## 2024-02-27 RX ORDER — METOPROLOL TARTRATE 50 MG
25 TABLET ORAL ONCE
Refills: 0 | Status: COMPLETED | OUTPATIENT
Start: 2024-02-27 | End: 2024-02-27

## 2024-02-27 RX ORDER — SACUBITRIL AND VALSARTAN 24; 26 MG/1; MG/1
1 TABLET, FILM COATED ORAL
Qty: 60 | Refills: 0
Start: 2024-02-27

## 2024-02-27 RX ADMIN — Medication 25 MILLIGRAM(S): at 14:54

## 2024-02-27 RX ADMIN — RIVAROXABAN 15 MILLIGRAM(S): KIT at 17:49

## 2024-02-27 RX ADMIN — Medication 50 MILLIGRAM(S): at 08:49

## 2024-02-27 RX ADMIN — SPIRONOLACTONE 25 MILLIGRAM(S): 25 TABLET, FILM COATED ORAL at 05:55

## 2024-02-27 NOTE — ED CDU PROVIDER SUBSEQUENT DAY NOTE - HISTORY
83-year-old female with past medical history of A-fib on Xarelto, CHF,  sent in by primary care provider for rapid A-fib and low blood pressure in the office today.  Patient was recently admitted to [facility] for CHF and A-fib and had follow up visit with PMD today. Does not have caridologist. Pt was discharged home on Toprol, Entresto, spironolactone, Lasix, Xarelto, and atorvastatin.  Patient has been feeling well denies any chest pain shortness of breath palpitations syncope.  Cameroonian speaking translated by rene Dixon at bedside.  Blood pressure improved on its own in the ED to 90s systolic.  Evaluated by electrophysiology recommending one-time dose of metoprolol 50 mg and Lasix 40 mg IV and to continue monitoring vitals and telemetry monitoring in CDU.    In the interim, pt a fib on tele rates  with multiple PVCs, pt has no complaints of chest pain, palpitations or SOB at this time.

## 2024-02-27 NOTE — ED CDU PROVIDER DISPOSITION NOTE - PATIENT PORTAL LINK FT
You can access the FollowMyHealth Patient Portal offered by [facility] by registering at the following website: [email or web address]. By joining [employer]’s FollowMyHealth portal, you will also be able to view your health information using other applications (apps) compatible with our system.

## 2024-02-27 NOTE — ED CDU PROVIDER SUBSEQUENT DAY NOTE - PROGRESS NOTE DETAILS
ALEKSANDRA Romo: 83-year-old female with a history of A-fib on Xarelto, CHF presented to the emergency department from PMD office due to low BP and rapid A-fib.  Patient was sent from PMD office to [facility] for treatment of rapid A-fib.  Patient placed in CDU for monitoring, rate control, EP consult.  Patient seen and cleared by EP per ALEKSANDRA Barrera patient okay for discharge home advised to continue Toprol-XL 50 daily and decrease Entresto dose to 24 mg / 26 mg twice daily and follow-up outpatient.  Patient son at bedside, discharge and results reviewed.

## 2024-02-27 NOTE — ED CDU PROVIDER DISPOSITION NOTE - CLINICAL COURSE
ALEKSANDRA Romo: 83-year-old female with a history of A-fib on Xarelto, CHF presented to the emergency department from PMD office due to low BP and rapid A-fib.  Patient was cemented to [facility] for treatment of rapid A-fib.  Patient placed in CDU for monitoring, rate control, EP consult.  Patient seen and cleared by EP per ALEKSANDRA Barrera patient okay for discharge home advised to continue Toprol-XL 50 daily and decrease Entresto dose to 24 mg / 26 mg twice daily and follow-up outpatient.  Patient son at bedside, discharge and results reviewed. ALEKSANDRA Romo: 83-year-old female with a history of A-fib on Xarelto, CHF presented to the emergency department from PMD office due to low BP and rapid A-fib.  Patient was sent from PMD office to [facility] for treatment of rapid A-fib.  Patient placed in CDU for monitoring, rate control, EP consult.  Patient seen and cleared by EP per ALEKSANDRA Barrera patient okay for discharge home advised to continue Toprol-XL 50 daily and decrease Entresto dose to 24 mg / 26 mg twice daily and follow-up outpatient.  Patient son at bedside, discharge and results reviewed.

## 2024-02-27 NOTE — ED CDU PROVIDER DISPOSITION NOTE - CARE PROVIDERS DIRECT ADDRESSES
[email or web address]

## 2024-02-27 NOTE — ED CDU PROVIDER DISPOSITION NOTE - NSFOLLOWUPINSTRUCTIONS_ED_ALL_ED_FT
Follow up with your Primary Medical Doctor in 1-2 days.  Follow up with Cardiac Electrophysiology Dr Alberts in 1-2 days call to schedule an appointment.  Follow up with Cardiology in 1-2 days.  Continue to take Toprol XL 50mg orally once a day.  Take Entresto 24mg/26mg one tablet orally 2 x a day and stop your current dose as discussed.    Return to the ER for any persistent/worsening or new symptoms chest pain, shortness of breath, palpitations, weakness, dizziness or any concerning symptoms.

## 2024-02-27 NOTE — ED CDU PROVIDER SUBSEQUENT DAY NOTE - CARDIAC PEDAL EDEMA
Bed/Stretcher in lowest position, wheels locked, appropriate side rails in place/Call bell, personal items and telephone in reach/Instruct patient to call for assistance before getting out of bed/chair/stretcher/Non-slip footwear applied when patient is off stretcher/[place] to call system/Physically safe environment - no spills, clutter or unnecessary equipment/Purposeful proactive rounding/Room/bathroom lighting operational, light cord in reach
absent

## 2024-02-27 NOTE — ED CDU PROVIDER SUBSEQUENT DAY NOTE - CLINICAL SUMMARY MEDICAL DECISION MAKING FREE TEXT BOX
83-year-old female with past medical history of A-fib on Xarelto, CHF,  sent in by primary care provider for rapid A-fib and low blood pressure in the office today.  Patient was recently admitted to [facility] for CHF and A-fib and had follow up visit with PMD today. Does not have caridologist. Pt was discharged home on Toprol, Entresto, spironolactone, Lasix, Xarelto, and atorvastatin.  Patient has been feeling well denies any chest pain shortness of breath palpitations syncope.  Bulgarian speaking translated by rene Dixon at bedside.  Blood pressure improved on its own in the ED to 90s systolic.  Evaluated by electrophysiology recommending one-time dose of metoprolol 50 mg and Lasix 40 mg IV and to continue monitoring vitals and telemetry monitoring in CDU.

## 2024-02-27 NOTE — PROGRESS NOTE ADULT - SUBJECTIVE AND OBJECTIVE BOX
24H hour events: Pt seen and examined at bedside with son present. No acute complaints. Denies SOB, NICHOLS, CP, palpitations, dizziness, HA  Overnight tele: AF 90-120s    MEDICATIONS:  spironolactone 25 milliGRAM(s) Oral daily  atorvastatin 40 milliGRAM(s) Oral at bedtime  Metoprolol Tartrate 50mg BID    REVIEW OF SYSTEMS:  See HPI, otherwise ROS negative.    PHYSICAL EXAM:  T(C): 36.4 (02-27-24 @ 05:31), Max: 36.7 (02-26-24 @ 15:00)  HR: 97 (02-27-24 @ 11:08) (79 - 117)  BP: 94/45 (02-27-24 @ 10:41) (94/45 - 117/78)  RR: 16 (02-27-24 @ 10:41) (16 - 19)  SpO2: 100% (02-27-24 @ 10:41) (96% - 100%)  Wt(kg): --  I&O's Summary    Appearance: Alert. NAD	  Cardiovascular: +S1S2 irregularly irregular  Respiratory: CTA B/L	  Psychiatry: A & O x 3, Mood & affect appropriate  Gastrointestinal:  Soft, NT. ND. +BS	  Skin: No rashes	  Extremities: No edema BLE  Vascular: Peripheral pulses palpable 2+ bilaterally    LABS:	 	    CBC Full  -  ( 26 Feb 2024 14:30 )  WBC Count : 10.19 K/uL  Hemoglobin : 13.5 g/dL  Hematocrit : 42.5 %  Platelet Count - Automated : 266 K/uL  Mean Cell Volume : 81.7 fL  Mean Cell Hemoglobin : 26.0 pg  Mean Cell Hemoglobin Concentration : 31.8 gm/dL  Auto Neutrophil # : 6.27 K/uL  Auto Lymphocyte # : 2.97 K/uL  Auto Monocyte # : 0.77 K/uL  Auto Eosinophil # : 0.07 K/uL  Auto Basophil # : 0.07 K/uL  Auto Neutrophil % : 61.5 %  Auto Lymphocyte % : 29.1 %  Auto Monocyte % : 7.6 %  Auto Eosinophil % : 0.7 %  Auto Basophil % : 0.7 %    02-26    140  |  98  |  33<H>  ----------------------------<  79  4.6   |  30  |  1.20  02-26    135  |  99  |  34<H>  ----------------------------<  90  SeeComment SPECIMEN SEVERELY HEMOLYZED   |  22  |  1.14    Ca    9.3      26 Feb 2024 18:34  Ca    9.0      26 Feb 2024 14:30    TPro  7.1  /  Alb  4.1  /  TBili  0.6  /  DBili  x   /  AST  22  /  ALT  12  /  AlkPhos  61  02-26  TPro  6.6  /  Alb  3.5  /  TBili  0.4  /  DBili  x   /  AST  51<H>  /  ALT  14  /  AlkPhos  49  02-26    TELEMETRY: AF 90-120s

## 2024-02-27 NOTE — ED CDU PROVIDER DISPOSITION NOTE - CARE PROVIDER_API CALL
Chaim Alberts  Cardiac Electrophysiology  [address] [address] [place] [address], [address]  [place] [address]  Phone: [phone]  Fax: [phone]  Follow Up Time:

## 2024-02-27 NOTE — PROGRESS NOTE ADULT - ASSESSMENT
84 yo Icelandic speaking F with a PMH of, HTN, adenomatous nodular goiter, and AFib on Xarelto for at least 3 yrs now who was recently hospitalized earlier this month for new acutely decompensated HF (LVEF 20-25%) w/ BNP ~15K and rapid AFib in the 130s; managed on BIPAP briefly in the ED, Dig loaded, diuresed and dc'd on GDMT for HF. Xarelto adjusted for CrCl. Sent to ED by PCP for tachycardic with SBPs in the 80s; without symptoms. In the ED pt was found to be in Afib V rates 90-120s. EP consults for AF management.    1. AF w/RVR    - Continue to monitor on tele while inpatient  - Pt currently asymptomatic AF 100s-120s  - Continue Metoprolol Tartrate 50mg BID, uptitrate as BP and HR will allow for better rate control  - Decrease Entresto dose to 24mg/26mg, likely contributing to low BP  - Keep K>4.0 and Mg>2.0, replete as necessary  - EP following, possible D/C home today if BP and HR improve with medication adjustments  - Discussed with primary team.      JUANITO CabreraC  [id number]   84 yo Chadian speaking F with a PMH of, HTN, adenomatous nodular goiter, and AFib on Xarelto for at least 3 yrs now who was recently hospitalized earlier this month for new acutely decompensated HF (LVEF 20-25%) w/ BNP ~15K and rapid AFib in the 130s; managed on BIPAP briefly in the ED, Dig loaded, diuresed and dc'd on GDMT for HF. Xarelto adjusted for CrCl. Sent to ED by PCP for tachycardic with SBPs in the 80s; without symptoms. In the ED pt was found to be in Afib V rates 90-120s. EP consults for AF management.    1. AF w/RVR    - Continue to monitor on tele while inpatient  - Pt currently asymptomatic AF 100s-120s  - Continue Xarelto 15mg daily for elevated [id number]/Stroke risk reduction. Dosing adjusted for CrCl  - Continue Metoprolol Tartrate 50mg BID, uptitrate as BP and HR will allow for better rate control  - Decrease Entresto dose to 24mg/26mg, likely contributing to low BP  - Keep K>4.0 and Mg>2.0, replete as necessary  - EP following, possible D/C home today if BP and HR improve with medication adjustments  - Discussed with primary team.      ALEKSANDRA Cabrera-C  [id number]   84 yo Turks and Ca[place] Islander speaking F with a PMH of, HTN, adenomatous nodular goiter, and AFib on Xarelto for at least 3 yrs now who was recently hospitalized earlier this month for new acutely decompensated HF (LVEF 20-25%) w/ BNP ~15K and rapid AFib in the 130s; managed on BIPAP briefly in the ED, Dig loaded, diuresed and dc'd on GDMT for HF. Xarelto adjusted for CrCl. Sent to ED by PCP for tachycardic with SBPs in the 80s; without symptoms. In the ED pt was found to be in Afib V rates 90-120s. EP consults for AF management.    1. AF w/RVR    - Continue to monitor on tele while inpatient  - Pt currently asymptomatic AF 100s-120s  - Continue Xarelto 15mg daily for elevated [id number]/Stroke risk reduction. Dosing adjusted for CrCl  - Continue Metoprolol Tartrate 50mg BID, uptitrate as BP and HR will allow for better rate control  - Decrease Entresto dose to 24mg/26mg, likely contributing to low BP  - Keep K>4.0 and Mg>2.0, replete as necessary  - EP following, possible D/C home today if BP and HR improve with medication adjustments  - Discussed with primary team.    ADDENDUM  - BP improved and pt asymptomatic with HR 90-110s  - Resume home dose Toprol XL 50 daily  - Pt cleared for discharge from EP perspective.       JUANITO CabreraC  [id number]

## 2024-02-28 ENCOUNTER — TRANSCRIPTION ENCOUNTER (OUTPATIENT)
Age: 84
End: 2024-02-28

## 2024-02-29 ENCOUNTER — APPOINTMENT (OUTPATIENT)
Age: 84
End: 2024-02-29
Payer: MEDICARE

## 2024-02-29 VITALS
HEART RATE: 60 BPM | SYSTOLIC BLOOD PRESSURE: 106 MMHG | RESPIRATION RATE: 16 BRPM | DIASTOLIC BLOOD PRESSURE: 60 MMHG | OXYGEN SATURATION: 98 % | TEMPERATURE: 97.1 F

## 2024-02-29 DIAGNOSIS — I48.91 UNSPECIFIED ATRIAL FIBRILLATION: ICD-10-CM

## 2024-02-29 DIAGNOSIS — I10 ESSENTIAL (PRIMARY) HYPERTENSION: ICD-10-CM

## 2024-02-29 DIAGNOSIS — I50.9 HEART FAILURE, UNSPECIFIED: ICD-10-CM

## 2024-02-29 PROCEDURE — 99349 HOME/RES VST EST MOD MDM 40: CPT

## 2024-02-29 RX ORDER — SACUBITRIL AND VALSARTAN 24; 26 MG/1; MG/1
24-26 TABLET, FILM COATED ORAL TWICE DAILY
Qty: 60 | Refills: 0 | Status: ACTIVE | COMMUNITY
Start: 2024-02-21

## 2024-02-29 NOTE — PLAN
[FreeTextEntry1] : Patient seen in the home for post hospital follow up and recent ED T&R, appts were scheduled with the following providers: PCP Dr. Robison 3/5 1:40pm  EP Dr. Casper 3/6 11:45AM  Cardiologist Dr. Le 3/14 10AM   Educated patient and family on fall and safety precautions, aspiration precautions, heart healthy diet, daily weights, medication compliance, good skin care and monitor/notify MD/NP for signs/symptoms of fluid overload and electrolyte abnormalities, such as, shortness of breath, cough, swelling, chest discomfort, changes in heart rate, dizziness, fainting, or changes in mental status. Reinforced provider follow up. Continue home care services to strengthen muscles. 24/7 TCM contact provided and encouraged to call with any questions or concerns.

## 2024-02-29 NOTE — INTERPRETER SERVICES
[Ad Hoc ] : provided by an ad hoc  [Interpreters_FullName] : Zack [Interpreters_Relationshiptopatient] : son [TWNoteComboBox1] : Angolan

## 2024-02-29 NOTE — PHYSICAL EXAM
[No Acute Distress] : no acute distress [Well-Appearing] : well-appearing [Normal Sclera/Conjunctiva] : normal sclera/conjunctiva [PERRL] : pupils equal round and reactive to light [Normal Outer Ear/Nose] : the outer ears and nose were normal in appearance [EOMI] : extraocular movements intact [Normal Oropharynx] : the oropharynx was normal [Supple] : supple [Clear to Auscultation] : lungs were clear to auscultation bilaterally [No Respiratory Distress] : no respiratory distress  [Pedal Pulses Present] : the pedal pulses are present [No Accessory Muscle Use] : no accessory muscle use [Normal Rate] : normal rate  [No Extremity Clubbing/Cyanosis] : no extremity clubbing/cyanosis [No Edema] : there was no peripheral edema [Normal Bowel Sounds] : normal bowel sounds [Non Tender] : non-tender [Soft] : abdomen soft [No Joint Swelling] : no joint swelling [No Rash] : no rash [Coordination Grossly Intact] : coordination grossly intact [Normal Gait] : normal gait [Normal Affect] : the affect was normal [Alert and Oriented x3] : oriented to person, place, and time [Normal Insight/Judgement] : insight and judgment were intact

## 2024-02-29 NOTE — HISTORY OF PRESENT ILLNESS
[Post-hospitalization from ___ Hospital] : Post-hospitalization from [unfilled] Hospital [FreeTextEntry2] : Ms. Love is a 83 year old female with past medical history of HTN, HLD, A-fib on Xarelto, adenomatous nodular goiter, recent admission at [facility] from 2/12-2/16 for acute decompensated heart failure and atrial fibrillation with RVR, followed up with PCP Dr. Robison on 2/26, and sent to ED for hypotension and A. fib. Patient was placed in CDU for monitoring, rate control, EP consult. Patient seen and cleared by EP, per ALEKSANDRA Barrera patient okay for discharge home, advised to continue Toprol-XL 50 daily and decrease Entresto dose to 24 mg / 26 mg twice daily and follow-up outpatient.   Patient seen for post hospital follow up. Patient A&O x3, pleasant and in nad. Son Zack and PT Crystal present.  Since home, doing well, seen by visiting nurse yesterday and today. Aware of changes in medications and tolerating. Pt observed working with physical therapist, using stationary foot pedal and completed exercises with no distress noted. Monitoring daily weights - today 113lbs and ranging between 110-113lbs. Sleeps with 1 pillow at night, no orthopnea and ambulates about half a city block prior to needing rest. Has walker in home but ambulates independently. Appetite improving and sleeps well. Lives with spouse and son Zack.  Denies any SOB, CP, lightheadedness/dizziness or any abnormal bruising

## 2024-02-29 NOTE — HEALTH RISK ASSESSMENT
[No] : No [No falls in past year] : Patient reported no falls in the past year [Low Salt Diet] : low salt [General Adherence] : general adherence [With Family] : lives with family [] :  [# Of Children ___] : has [unfilled] children [Fully functional (bathing, dressing, toileting, transferring, walking, feeding)] : Fully functional (bathing, dressing, toileting, transferring, walking, feeding) [Independent] : preparing meals [Some assistance needed] : managing medications [With Patient/Caregiver] : , with patient/caregiver [Never] : Never [Change in mental status noted] : No change in mental status noted [AdvancecareDate] : 2/29/2024 [FreeTextEntry4] : Goals of care and health care proxy form reviewed, son states they would all measures done including CPR and intubation. Family will complete HCP.

## 2024-02-29 NOTE — CURRENT MEDS
[Takes medication as prescribed] : takes [Blood Thinners] : blood thinners [Yes] : Reviewed medication list for presence of high-risk medications.

## 2024-03-06 ENCOUNTER — APPOINTMENT (OUTPATIENT)
Dept: ELECTROPHYSIOLOGY | Facility: CLINIC | Age: 84
End: 2024-03-06

## 2024-03-06 NOTE — HISTORY OF PRESENT ILLNESS
[FreeTextEntry1] : Referring Physician:  Dear  *****:   Ms. *** was seen in the [facility] today. For our records, please allow me to summarize the history and my findings.   This pleasant *** year old woman has a cardiovascular history significant for HTN, HLD, A-fib on Xarelto, recently admitted for new onset on Xarelto, acute decompensated  heart failure  and Afib with RVR on 2/12/24. Echo on 2/13/24 showed LVEF 20-25%. Started and remains on GDMT ( Entresto 49/51mg bid, Toprol 50mg qd, Aldactone 25mg qd, Lasix 40mg PO qd Reduce dose of Xarelto from 20mg to 15mg qd     Ms. ***** denies any recent history of chest pain, shortness of breath, palpitations, dizziness, or syncope.

## 2024-03-07 RX ORDER — RIVAROXABAN 15 MG/1
15 TABLET, FILM COATED ORAL DAILY
Refills: 0 | Status: ACTIVE | COMMUNITY

## 2024-03-12 ENCOUNTER — TRANSCRIPTION ENCOUNTER (OUTPATIENT)
Age: 84
End: 2024-03-12

## 2024-03-14 ENCOUNTER — APPOINTMENT (OUTPATIENT)
Dept: CARDIOLOGY | Facility: CLINIC | Age: 84
End: 2024-03-14

## 2025-08-28 ENCOUNTER — EMERGENCY (EMERGENCY)
Facility: HOSPITAL | Age: 85
LOS: 1 days | End: 2025-08-28
Attending: STUDENT IN AN ORGANIZED HEALTH CARE EDUCATION/TRAINING PROGRAM | Admitting: STUDENT IN AN ORGANIZED HEALTH CARE EDUCATION/TRAINING PROGRAM
Payer: MEDICARE

## 2025-08-28 VITALS
RESPIRATION RATE: 18 BRPM | TEMPERATURE: 98 F | SYSTOLIC BLOOD PRESSURE: 137 MMHG | DIASTOLIC BLOOD PRESSURE: 81 MMHG | OXYGEN SATURATION: 99 % | HEART RATE: 85 BPM

## 2025-08-28 VITALS
HEIGHT: 60 IN | RESPIRATION RATE: 16 BRPM | DIASTOLIC BLOOD PRESSURE: 91 MMHG | SYSTOLIC BLOOD PRESSURE: 156 MMHG | WEIGHT: 130.07 LBS | OXYGEN SATURATION: 95 % | HEART RATE: 100 BPM | TEMPERATURE: 97 F

## 2025-08-28 PROCEDURE — 99283 EMERGENCY DEPT VISIT LOW MDM: CPT | Mod: GC
